# Patient Record
Sex: FEMALE | Race: WHITE | NOT HISPANIC OR LATINO
[De-identification: names, ages, dates, MRNs, and addresses within clinical notes are randomized per-mention and may not be internally consistent; named-entity substitution may affect disease eponyms.]

---

## 2024-09-05 VITALS
HEART RATE: 97 BPM | TEMPERATURE: 98 F | HEIGHT: 68 IN | WEIGHT: 169.98 LBS | SYSTOLIC BLOOD PRESSURE: 127 MMHG | OXYGEN SATURATION: 97 % | RESPIRATION RATE: 16 BRPM | DIASTOLIC BLOOD PRESSURE: 84 MMHG

## 2024-09-05 LAB
ADD ON TEST-SPECIMEN IN LAB: SIGNIFICANT CHANGE UP
ANION GAP SERPL CALC-SCNC: 11 MMOL/L — SIGNIFICANT CHANGE UP (ref 5–17)
BASOPHILS # BLD AUTO: 0.07 K/UL — SIGNIFICANT CHANGE UP (ref 0–0.2)
BASOPHILS NFR BLD AUTO: 0.7 % — SIGNIFICANT CHANGE UP (ref 0–2)
BUN SERPL-MCNC: 18 MG/DL — SIGNIFICANT CHANGE UP (ref 7–23)
CALCIUM SERPL-MCNC: 9.9 MG/DL — SIGNIFICANT CHANGE UP (ref 8.4–10.5)
CHLORIDE SERPL-SCNC: 100 MMOL/L — SIGNIFICANT CHANGE UP (ref 96–108)
CO2 SERPL-SCNC: 27 MMOL/L — SIGNIFICANT CHANGE UP (ref 22–31)
CREAT SERPL-MCNC: 0.74 MG/DL — SIGNIFICANT CHANGE UP (ref 0.5–1.3)
EGFR: 114 ML/MIN/1.73M2 — SIGNIFICANT CHANGE UP
EOSINOPHIL # BLD AUTO: 0.31 K/UL — SIGNIFICANT CHANGE UP (ref 0–0.5)
EOSINOPHIL NFR BLD AUTO: 3 % — SIGNIFICANT CHANGE UP (ref 0–6)
GLUCOSE SERPL-MCNC: 92 MG/DL — SIGNIFICANT CHANGE UP (ref 70–99)
HCG SERPL-ACNC: <1 MIU/ML — SIGNIFICANT CHANGE UP
HCT VFR BLD CALC: 38.7 % — SIGNIFICANT CHANGE UP (ref 34.5–45)
HGB BLD-MCNC: 12.9 G/DL — SIGNIFICANT CHANGE UP (ref 11.5–15.5)
IMM GRANULOCYTES NFR BLD AUTO: 0.4 % — SIGNIFICANT CHANGE UP (ref 0–0.9)
LYMPHOCYTES # BLD AUTO: 2.07 K/UL — SIGNIFICANT CHANGE UP (ref 1–3.3)
LYMPHOCYTES # BLD AUTO: 20 % — SIGNIFICANT CHANGE UP (ref 13–44)
MCHC RBC-ENTMCNC: 29.9 PG — SIGNIFICANT CHANGE UP (ref 27–34)
MCHC RBC-ENTMCNC: 33.3 GM/DL — SIGNIFICANT CHANGE UP (ref 32–36)
MCV RBC AUTO: 89.6 FL — SIGNIFICANT CHANGE UP (ref 80–100)
MONOCYTES # BLD AUTO: 0.98 K/UL — HIGH (ref 0–0.9)
MONOCYTES NFR BLD AUTO: 9.5 % — SIGNIFICANT CHANGE UP (ref 2–14)
NEUTROPHILS # BLD AUTO: 6.88 K/UL — SIGNIFICANT CHANGE UP (ref 1.8–7.4)
NEUTROPHILS NFR BLD AUTO: 66.4 % — SIGNIFICANT CHANGE UP (ref 43–77)
NRBC # BLD: 0 /100 WBCS — SIGNIFICANT CHANGE UP (ref 0–0)
PLATELET # BLD AUTO: 286 K/UL — SIGNIFICANT CHANGE UP (ref 150–400)
POTASSIUM SERPL-MCNC: 3.6 MMOL/L — SIGNIFICANT CHANGE UP (ref 3.5–5.3)
POTASSIUM SERPL-SCNC: 3.6 MMOL/L — SIGNIFICANT CHANGE UP (ref 3.5–5.3)
RBC # BLD: 4.32 M/UL — SIGNIFICANT CHANGE UP (ref 3.8–5.2)
RBC # FLD: 11.9 % — SIGNIFICANT CHANGE UP (ref 10.3–14.5)
SODIUM SERPL-SCNC: 138 MMOL/L — SIGNIFICANT CHANGE UP (ref 135–145)
WBC # BLD: 10.35 K/UL — SIGNIFICANT CHANGE UP (ref 3.8–10.5)
WBC # FLD AUTO: 10.35 K/UL — SIGNIFICANT CHANGE UP (ref 3.8–10.5)

## 2024-09-05 PROCEDURE — 73701 CT LOWER EXTREMITY W/DYE: CPT | Mod: 26,RT,MC

## 2024-09-05 PROCEDURE — 73630 X-RAY EXAM OF FOOT: CPT | Mod: 26,RT

## 2024-09-05 PROCEDURE — 99285 EMERGENCY DEPT VISIT HI MDM: CPT

## 2024-09-05 RX ORDER — CEFAZOLIN SODIUM 2 G/100ML
1000 INJECTION, SOLUTION INTRAVENOUS ONCE
Refills: 0 | Status: COMPLETED | OUTPATIENT
Start: 2024-09-05 | End: 2024-09-05

## 2024-09-05 RX ORDER — DOXYCYCLINE MONOHYDRATE 100 MG
100 TABLET ORAL ONCE
Refills: 0 | Status: COMPLETED | OUTPATIENT
Start: 2024-09-05 | End: 2024-09-05

## 2024-09-05 RX ADMIN — Medication 110 MILLIGRAM(S): at 20:09

## 2024-09-05 RX ADMIN — CEFAZOLIN SODIUM 100 MILLIGRAM(S): 2 INJECTION, SOLUTION INTRAVENOUS at 19:55

## 2024-09-05 NOTE — ED ADULT NURSE NOTE - CHIEF COMPLAINT QUOTE
Pt states "a little over a week ago I was on vacation and stung by a stingray. They gave me a tetanus. I went to an urgent care again and she said it looked infected." Pt denies fever at home.

## 2024-09-05 NOTE — CONSULT NOTE ADULT - ASSESSMENT
29 year old female with no PMH presents for discoloration and swelling to dorsal Right foot due to an injury in salt water. Says she was swimming in the [employer] last week when her foot was cut in the water. She isn't sure what it was, but thinks it was a sting ray. Pt has dorsal puncture wounds to Right 2nd toe. Superficial wounds. No fluctuance. No drainage. No malodor. No PTB. Purplish discoloration to dorsal forefoot. Blanches on palpation. Interspaces are intact. At time of consult, WBC 10.3, ESR 15, CRP 6.3. VSS. There is no evidence of foreign body, no gas, no fracture as seen on Xray.  29 year old female with no PMH presents for discoloration and swelling to dorsal Right foot due to an injury in salt water. Says she was swimming in the [employer] last week when her foot was cut in the water. She isn't sure what it was, but thinks it was a sting ray. Pt has dorsal puncture wounds to Right 2nd toe. Superficial wounds. No fluctuance. No drainage. No malodor. No PTB. Purplish discoloration to dorsal forefoot. Blanches on palpation. Interspaces are intact. At time of consult, WBC 10.3, ESR 15, CRP 6.3. VSS. There is no evidence of foreign body, no gas, no fracture as seen on Xray. Concern for abnormally high swelling of Right foot.     Plan:    -Recommend pt to be admitted to Medicine.   -Recommend broad spectrum IV abx.  -Dressing changes, local wound care, monitoring edema, and monitoring of cardinal signs of infection.  -CT reviewed.  -X-rays reviewed.  -Offloading/WB status: WBAT in surgical shoe   -Right lower extremity should be placed in a elevated position.  -Wounds cleansed with 30 cc of betadine flush.  -Dressed with DSD, ACE.   -Rest of care up to primary team      Podiatry following, Plan discussed with attending.

## 2024-09-05 NOTE — ED PROVIDER NOTE - CLINICAL SUMMARY MEDICAL DECISION MAKING FREE TEXT BOX
Marine envenomation right dorsal foot at base of 2-3rd toes.  Occurred 1 week ago, initially treated with tetanus booster and soaked in iodine; swelling and redness increased in the past day.  Likely early infection developing. Swelling is mild, no concern for compartment syndrome at this time. No crepitus or other evidence to suggest necrotizing infection at this time.  No leg swelling or calf tenderness to suggest DVT. No evidence of limb ischemia. Pt  states it was likely stingray envenomation, and in salt water.  Triple abx coverage (per UTDOL recommendations) started (1st gen cephalosporin, doxycycline, levaquin).  No FB or subcut gas seen on XR.  No fever or other symptoms of acute systemic illness.  WBC upper limit normal.  Podiatry to consult.

## 2024-09-05 NOTE — ED ADULT TRIAGE NOTE - CHIEF COMPLAINT QUOTE
Pt states "a little over a week ago I was on vacation and stung by a stingray. They gave me a tetanus. I went to an urgent care again and she said it looked infected." Pt denies fever at home. return to ED if symptoms worsen, persist or questions arise/need for outpatient follow-up/lab results

## 2024-09-05 NOTE — CONSULT NOTE ADULT - SUBJECTIVE AND OBJECTIVE BOX
Attending: Dr. Paulson    Patient is a 29y old  Female who presents with a chief complaint of Right foot salt water wound.     HPI:    29 year old female with no PMH presents for discoloration and swelling to dorsal Right foot due to an injury in salt water. Says she was swimming in the [employer] last week when her foot was cut in the water. She isn't sure what it was, but thinks it was a sting ray. Pt went to urgent care where they did Betadine flush and gave her a tetanus shot. They did not discharge her on antibiotics. Says she has been treating her foot with neosporin and covering with band aid, and doing Epsom salt soaks. She said the swelling came down a bit but last night it went back up. Pt states the discoloration to her foot has stayed the same. Denies any drainage or pus from the wounds. Denies any pain to the area. Admits to numbness to dorsal foot. Currently on Cefazolin, Doxycycline, Levofloxacin in ED.     Review of systems negative except per HPI and as stated below  General:	 no weakness; no fevers, no chills  Skin/Breast: no rash  Respiratory and Thorax: no SOB, no cough  Cardiovascular:	No chest pain  Gastrointestinal:	 no nausea, vomiting , diarrhea  Genitourinary:	no dysuria, no difficulty urinating, no hematuria  Musculoskeletal:	no weakness, no joint swelling/pain  Neurological:	no focal weakness/numbness  Endocrine:	no polyuria, no polydipsia    PAST MEDICAL & SURGICAL HISTORY:    Home Medications:    Allergies    No Known Allergies    Intolerances      FAMILY HISTORY:    Social History:       LABS                        12.9   10.35 )-----------( 286      ( 05 Sep 2024 20:06 )             38.7     09-05    138  |  100  |  18  ----------------------------<  92  3.6   |  27  |  0.74    Ca    9.9      05 Sep 2024 20:06        ESR: 15  CRP: --  09-05 @ 20:06    Vital Signs Last 24 Hrs  T(C): 36.9 (05 Sep 2024 18:33), Max: 36.9 (05 Sep 2024 18:33)  T(F): 98.4 (05 Sep 2024 18:33), Max: 98.4 (05 Sep 2024 18:33)  HR: 97 (05 Sep 2024 18:33) (97 - 97)  BP: 127/84 (05 Sep 2024 18:33) (127/84 - 127/84)  BP(mean): --  RR: 16 (05 Sep 2024 18:33) (16 - 16)  SpO2: 97% (05 Sep 2024 18:33) (97% - 97%)    Parameters below as of 05 Sep 2024 18:33  Patient On (Oxygen Delivery Method): room air        PHYSICAL EXAM  General: NAD, AA0x3    Lower Extremity Focused:  Vasc:  Derm:  Neuro:  MSK:     Gait Examination:    RADIOLOGY                       Attending: Dr. Paulson    Patient is a 29y old  Female who presents with a chief complaint of Right foot salt water wound.     HPI:    29 year old female with no PMH presents for discoloration and swelling to dorsal Right foot due to an injury in salt water. Says she was swimming in the [place] last week when her foot was cut in the water. She isn't sure what it was, but thinks it was a sting ray. Pt went to urgent care where they did Betadine flush and gave her a tetanus shot. They did not discharge her on antibiotics. Says she has been treating her foot with neosporin and covering with band aid, and doing Epsom salt soaks. She said the swelling came down a bit but last night it went back up. Pt states the discoloration to her foot has stayed the same. Denies any drainage or pus from the wounds. Denies any pain to the area. Admits to numbness to dorsal foot. Currently on Cefazolin, Doxycycline, Levofloxacin in ED.     Review of systems negative except per HPI and as stated below  General:	 no weakness; no fevers, no chills  Skin/Breast: no rash  Respiratory and Thorax: no SOB, no cough  Cardiovascular:	No chest pain  Gastrointestinal:	 no nausea, vomiting , diarrhea  Genitourinary:	no dysuria, no difficulty urinating, no hematuria  Musculoskeletal:	no weakness, no joint swelling/pain  Neurological:	no focal weakness/numbness  Endocrine:	no polyuria, no polydipsia    PAST MEDICAL & SURGICAL HISTORY:    Home Medications:    Allergies    No Known Allergies    Intolerances      FAMILY HISTORY:    Social History:       LABS                        12.9   10.35 )-----------( 286      ( 05 Sep 2024 20:06 )             38.7     09-05    138  |  100  |  18  ----------------------------<  92  3.6   |  27  |  0.74    Ca    9.9      05 Sep 2024 20:06        ESR: 15  CRP: --  09-05 @ 20:06    Vital Signs Last 24 Hrs  T(C): 36.9 (05 Sep 2024 18:33), Max: 36.9 (05 Sep 2024 18:33)  T(F): 98.4 (05 Sep 2024 18:33), Max: 98.4 (05 Sep 2024 18:33)  HR: 97 (05 Sep 2024 18:33) (97 - 97)  BP: 127/84 (05 Sep 2024 18:33) (127/84 - 127/84)  BP(mean): --  RR: 16 (05 Sep 2024 18:33) (16 - 16)  SpO2: 97% (05 Sep 2024 18:33) (97% - 97%)    Parameters below as of 05 Sep 2024 18:33  Patient On (Oxygen Delivery Method): room air        PHYSICAL EXAM  General: NAD, AA0x3    Lower Extremity Focused:  Vasc: DP, PT 2/4. TG warm to cool. Edema to dorsal foot.   Derm: Dorsal puncture wounds to Right 2nd toe. Superficial wounds. No fluctuance. No drainage. No malodor. No PTB. Purplish discoloration to dorsal forefoot. Blanches on palpation. Interspaces are intact.   Neuro: Protective sensation intact.   MSK: -TTP to dorsal foot. No pain on ROM of 2nd toe.     Gait Examination:    RADIOLOGY  < from: Xray Foot AP + Lateral + Oblique, Right (09.05.24 @ 19:47) >  IMPRESSION: Negative for an acute displaced fracture or dislocation. The   joint spaces are preserved. There is no evidence of a radiopaque foreign   body.    --- End of Report ---    < end of copied text >                       Attending: Dr. Paulson    Patient is a 29y old  Female who presents with a chief complaint of Right foot salt water wound.     HPI:    29 year old female with no PMH presents for discoloration and swelling to dorsal Right foot due to an injury in salt water. Says she was swimming in the [employer] last week when her foot was cut in the water. She isn't sure what it was, but thinks it was a sting ray. Pt went to urgent care where they did Betadine flush and gave her a tetanus shot. They did not discharge her on antibiotics. Says she has been treating her foot with neosporin and covering with band aid, and doing Epsom salt soaks. She said the swelling came down a bit but last night it went back up. Pt states the discoloration to her foot has stayed the same. Denies any drainage or pus from the wounds. Denies any pain to the area. Admits to numbness to dorsal foot. Currently on Cefazolin, Doxycycline, Levofloxacin in ED.     Review of systems negative except per HPI and as stated below  General:	 no weakness; no fevers, no chills  Skin/Breast: no rash  Respiratory and Thorax: no SOB, no cough  Cardiovascular:	No chest pain  Gastrointestinal:	 no nausea, vomiting , diarrhea  Genitourinary:	no dysuria, no difficulty urinating, no hematuria  Musculoskeletal:	no weakness, no joint swelling/pain  Neurological:	no focal weakness/numbness  Endocrine:	no polyuria, no polydipsia    PAST MEDICAL & SURGICAL HISTORY:    Home Medications:    Allergies    No Known Allergies    Intolerances      FAMILY HISTORY:    Social History:       LABS                        12.9   10.35 )-----------( 286      ( 05 Sep 2024 20:06 )             38.7     09-05    138  |  100  |  18  ----------------------------<  92  3.6   |  27  |  0.74    Ca    9.9      05 Sep 2024 20:06        ESR: 15  CRP: --  09-05 @ 20:06    Vital Signs Last 24 Hrs  T(C): 36.9 (05 Sep 2024 18:33), Max: 36.9 (05 Sep 2024 18:33)  T(F): 98.4 (05 Sep 2024 18:33), Max: 98.4 (05 Sep 2024 18:33)  HR: 97 (05 Sep 2024 18:33) (97 - 97)  BP: 127/84 (05 Sep 2024 18:33) (127/84 - 127/84)  BP(mean): --  RR: 16 (05 Sep 2024 18:33) (16 - 16)  SpO2: 97% (05 Sep 2024 18:33) (97% - 97%)    Parameters below as of 05 Sep 2024 18:33  Patient On (Oxygen Delivery Method): room air        PHYSICAL EXAM  General: NAD, AA0x3    Lower Extremity Focused:  Vasc: DP, PT 2/4. TG warm to cool. Edema to dorsal foot.   Derm: Dorsal puncture wounds to Right 2nd toe. Superficial wounds. No fluctuance. No drainage. No malodor. No PTB. Purplish discoloration to dorsal forefoot. Blanches on palpation. Interspaces are intact.   Neuro: Protective sensation intact.   MSK: -TTP to dorsal foot. No pain on ROM of 2nd toe.     Gait Examination:    RADIOLOGY  < from: CT Foot w/ IV Cont, Right (09.05.24 @ 22:22) >  IMPRESSION:  Foot edema particularly on the dorsal medial aspect involving the   subcutaneous  soft tissues. No evidence for abscess formation.    < end of copied text >      < from: Xray Foot AP + Lateral + Oblique, Right (09.05.24 @ 19:47) >  IMPRESSION: Negative for an acute displaced fracture or dislocation. The   joint spaces are preserved. There is no evidence of a radiopaque foreign   body.    --- End of Report ---    < end of copied text >

## 2024-09-05 NOTE — ED PROVIDER NOTE - PHYSICAL EXAMINATION
CONSTITUTIONAL: NAD   SKIN: Normal color and turgor.    HEAD: NC/AT.  EYES: Conjunctiva clear. Anicteric sclera.  ENT: Airway clear. Normal voice. MMM.  RESPIRATORY:  Normal respiratory rate and effort.  CARDIOVASCULAR:  RRR   GI:  Abdomen soft, nontender.    MSK:  Dorsal puncture wounds without drainage to  base of right 2nd toe with irregular area of blanching purple discoloration. No fluctuance. Mild forefoot swelling, not tense.  Palpable DP and PT pulses, cap refill < 2 sec.   NEURO: Alert, clear mental status.  Speech clear. No focal deficits. Gait steady.

## 2024-09-05 NOTE — ED PROVIDER NOTE - OBJECTIVE STATEMENT
Patient presents today with progressive right foot swelling for 1 day after a stingray puncture wound 1 week ago. Patient was seen by urgent care at the time of injury to confirm no foreign bodies on imaging and received tetanus vaccination. She reports today the right foot  has unchanged discoloration surrounding the wound and notes worsening swelling with associated numbness since this morning. Patient denies pain with movement, decreased rang of motion, calf tenderness, tingling, smoking history, vascular disease, diabetes or current medications. Patient denies risk of pregnancy and allergies.

## 2024-09-05 NOTE — ED ADULT TRIAGE NOTE - AS TEMP SITE
Medication: albuterol inh  Last office visit date: 5/3/23  Next visit: none  Pharmacy: [id number] [id number]    Medication Refill Protocol Failed.  Failed criteria:    Patient is pregnant.   Sent to clinician to review.     
oral

## 2024-09-05 NOTE — ED ADULT NURSE NOTE - OBJECTIVE STATEMENT
Pt presents to ED S/P " stingray sting" to right foot with discoloration. Pt states, " It feels like I had novocaine in the part that is discolored". Pt able to ambulate independently. Pt presents to ED S/P " stingray sting" to right foot with discoloration and swelling. Pt states, " It feels like I had novocaine in the part that is discolored". Pt able to ambulate independently.

## 2024-09-06 ENCOUNTER — TRANSCRIPTION ENCOUNTER (OUTPATIENT)
Age: 29
End: 2024-09-06

## 2024-09-06 ENCOUNTER — INPATIENT (INPATIENT)
Facility: HOSPITAL | Age: 29
LOS: 2 days | Discharge: HOME | DRG: 603 | End: 2024-09-09
Attending: HOSPITALIST | Admitting: STUDENT IN AN ORGANIZED HEALTH CARE EDUCATION/TRAINING PROGRAM
Payer: COMMERCIAL

## 2024-09-06 DIAGNOSIS — L03.115 CELLULITIS OF RIGHT LOWER LIMB: ICD-10-CM

## 2024-09-06 DIAGNOSIS — R63.8 OTHER SYMPTOMS AND SIGNS CONCERNING FOOD AND FLUID INTAKE: ICD-10-CM

## 2024-09-06 LAB
ALBUMIN SERPL ELPH-MCNC: 4.2 G/DL — SIGNIFICANT CHANGE UP (ref 3.3–5)
ALP SERPL-CCNC: 72 U/L — SIGNIFICANT CHANGE UP (ref 40–120)
ALT FLD-CCNC: 16 U/L — SIGNIFICANT CHANGE UP (ref 10–45)
ANION GAP SERPL CALC-SCNC: 11 MMOL/L — SIGNIFICANT CHANGE UP (ref 5–17)
AST SERPL-CCNC: 15 U/L — SIGNIFICANT CHANGE UP (ref 10–40)
BASOPHILS # BLD AUTO: 0.05 K/UL — SIGNIFICANT CHANGE UP (ref 0–0.2)
BASOPHILS NFR BLD AUTO: 0.6 % — SIGNIFICANT CHANGE UP (ref 0–2)
BILIRUB SERPL-MCNC: 0.4 MG/DL — SIGNIFICANT CHANGE UP (ref 0.2–1.2)
BLD GP AB SCN SERPL QL: NEGATIVE — SIGNIFICANT CHANGE UP
BUN SERPL-MCNC: 13 MG/DL — SIGNIFICANT CHANGE UP (ref 7–23)
CALCIUM SERPL-MCNC: 9.5 MG/DL — SIGNIFICANT CHANGE UP (ref 8.4–10.5)
CHLORIDE SERPL-SCNC: 105 MMOL/L — SIGNIFICANT CHANGE UP (ref 96–108)
CO2 SERPL-SCNC: 22 MMOL/L — SIGNIFICANT CHANGE UP (ref 22–31)
CREAT SERPL-MCNC: 0.7 MG/DL — SIGNIFICANT CHANGE UP (ref 0.5–1.3)
EGFR: 121 ML/MIN/1.73M2 — SIGNIFICANT CHANGE UP
EOSINOPHIL # BLD AUTO: 0.37 K/UL — SIGNIFICANT CHANGE UP (ref 0–0.5)
EOSINOPHIL NFR BLD AUTO: 4.7 % — SIGNIFICANT CHANGE UP (ref 0–6)
GLUCOSE SERPL-MCNC: 94 MG/DL — SIGNIFICANT CHANGE UP (ref 70–99)
HCT VFR BLD CALC: 36.7 % — SIGNIFICANT CHANGE UP (ref 34.5–45)
HGB BLD-MCNC: 12.1 G/DL — SIGNIFICANT CHANGE UP (ref 11.5–15.5)
IMM GRANULOCYTES NFR BLD AUTO: 0.4 % — SIGNIFICANT CHANGE UP (ref 0–0.9)
LYMPHOCYTES # BLD AUTO: 1.87 K/UL — SIGNIFICANT CHANGE UP (ref 1–3.3)
LYMPHOCYTES # BLD AUTO: 23.7 % — SIGNIFICANT CHANGE UP (ref 13–44)
MAGNESIUM SERPL-MCNC: 2 MG/DL — SIGNIFICANT CHANGE UP (ref 1.6–2.6)
MCHC RBC-ENTMCNC: 29.9 PG — SIGNIFICANT CHANGE UP (ref 27–34)
MCHC RBC-ENTMCNC: 33 GM/DL — SIGNIFICANT CHANGE UP (ref 32–36)
MCV RBC AUTO: 90.6 FL — SIGNIFICANT CHANGE UP (ref 80–100)
MONOCYTES # BLD AUTO: 1.16 K/UL — HIGH (ref 0–0.9)
MONOCYTES NFR BLD AUTO: 14.7 % — HIGH (ref 2–14)
NEUTROPHILS # BLD AUTO: 4.4 K/UL — SIGNIFICANT CHANGE UP (ref 1.8–7.4)
NEUTROPHILS NFR BLD AUTO: 55.9 % — SIGNIFICANT CHANGE UP (ref 43–77)
NRBC # BLD: 0 /100 WBCS — SIGNIFICANT CHANGE UP (ref 0–0)
PHOSPHATE SERPL-MCNC: 4.4 MG/DL — SIGNIFICANT CHANGE UP (ref 2.5–4.5)
PLATELET # BLD AUTO: 252 K/UL — SIGNIFICANT CHANGE UP (ref 150–400)
POTASSIUM SERPL-MCNC: 3.9 MMOL/L — SIGNIFICANT CHANGE UP (ref 3.5–5.3)
POTASSIUM SERPL-SCNC: 3.9 MMOL/L — SIGNIFICANT CHANGE UP (ref 3.5–5.3)
PROT SERPL-MCNC: 7.1 G/DL — SIGNIFICANT CHANGE UP (ref 6–8.3)
RBC # BLD: 4.05 M/UL — SIGNIFICANT CHANGE UP (ref 3.8–5.2)
RBC # FLD: 11.9 % — SIGNIFICANT CHANGE UP (ref 10.3–14.5)
RH IG SCN BLD-IMP: POSITIVE — SIGNIFICANT CHANGE UP
SODIUM SERPL-SCNC: 138 MMOL/L — SIGNIFICANT CHANGE UP (ref 135–145)
WBC # BLD: 7.88 K/UL — SIGNIFICANT CHANGE UP (ref 3.8–10.5)
WBC # FLD AUTO: 7.88 K/UL — SIGNIFICANT CHANGE UP (ref 3.8–10.5)

## 2024-09-06 PROCEDURE — 99255 IP/OBS CONSLTJ NEW/EST HI 80: CPT

## 2024-09-06 PROCEDURE — 99223 1ST HOSP IP/OBS HIGH 75: CPT | Mod: GC

## 2024-09-06 RX ORDER — PIPERACILLIN SODIUM AND TAZOBACTAM SODIUM 3; .375 G/15ML; G/15ML
3.38 INJECTION, POWDER, FOR SOLUTION INTRAVENOUS ONCE
Refills: 0 | Status: DISCONTINUED | OUTPATIENT
Start: 2024-09-06 | End: 2024-09-06

## 2024-09-06 RX ORDER — CEFAZOLIN SODIUM 2 G/100ML
INJECTION, SOLUTION INTRAVENOUS
Refills: 0 | Status: DISCONTINUED | OUTPATIENT
Start: 2024-09-06 | End: 2024-09-06

## 2024-09-06 RX ORDER — CEFAZOLIN SODIUM 2 G/100ML
2000 INJECTION, SOLUTION INTRAVENOUS EVERY 8 HOURS
Refills: 0 | Status: DISCONTINUED | OUTPATIENT
Start: 2024-09-06 | End: 2024-09-06

## 2024-09-06 RX ORDER — DOXYCYCLINE MONOHYDRATE 100 MG
100 TABLET ORAL EVERY 12 HOURS
Refills: 0 | Status: DISCONTINUED | OUTPATIENT
Start: 2024-09-06 | End: 2024-09-07

## 2024-09-06 RX ORDER — CEFAZOLIN SODIUM 2 G/100ML
2000 INJECTION, SOLUTION INTRAVENOUS ONCE
Refills: 0 | Status: COMPLETED | OUTPATIENT
Start: 2024-09-06 | End: 2024-09-06

## 2024-09-06 RX ADMIN — Medication 110 MILLIGRAM(S): at 17:00

## 2024-09-06 RX ADMIN — CEFAZOLIN SODIUM 100 MILLIGRAM(S): 2 INJECTION, SOLUTION INTRAVENOUS at 14:08

## 2024-09-06 RX ADMIN — Medication 100 MILLIGRAM(S): at 21:36

## 2024-09-06 RX ADMIN — CEFAZOLIN SODIUM 100 MILLIGRAM(S): 2 INJECTION, SOLUTION INTRAVENOUS at 07:50

## 2024-09-06 NOTE — DISCHARGE NOTE PROVIDER - NSDCFUSCHEDAPPT_GEN_ALL_CORE_FT
Riley Cramer  [facility]  INFDISEASE [address]  Scheduled Appointment: 09/25/2024

## 2024-09-06 NOTE — DISCHARGE NOTE PROVIDER - PROVIDER TOKENS
PROVIDER:[TOKEN:[[id number]],SCHEDULEDAPPT:[09/10/2024]] FREE:[LAST:[Walkner],FIRST:[Kasandra],PHONE:[phone]],FAX:[(   )    -],ADDRESS:[[address] [place] [address]],SCHEDULEDAPPT:[09/10/2024],SCHEDULEDAPPTTIME:[11:00 AM]] FREE:[LAST:[Walkner],FIRST:[Kasandra],PHONE:[phone]],FAX:[(   )    -],ADDRESS:[[address] [place] [address]],SCHEDULEDAPPT:[09/13/2024],SCHEDULEDAPPTTIME:[01:30 PM]] FREE:[LAST:[Walkner],FIRST:[Kasandra],PHONE:[phone]],FAX:[(   )    -],ADDRESS:[[address] [place] [address]. [place] [address]],SCHEDULEDAPPT:[09/13/2024],SCHEDULEDAPPTTIME:[01:30 PM]],PROVIDER:[TOKEN:[[id number]],FOLLOWUP:[2 weeks]] PROVIDER:[TOKEN:[[id number]],SCHEDULEDAPPT:[09/25/2024],SCHEDULEDAPPTTIME:[10:20 AM]],FREE:[LAST:[Walkner],FIRST:[Kasandra],PHONE:[phone]],FAX:[(   )    -],ADDRESS:[[address] [place] [address]],SCHEDULEDAPPT:[09/13/2024],SCHEDULEDAPPTTIME:[01:30 PM]]

## 2024-09-06 NOTE — H&P ADULT - NSHPLABSRESULTS_GEN_ALL_CORE
LABS:                        12.9   10.35 )-----------( 286      ( 05 Sep 2024 20:06 )             38.7     09-05    138  |  100  |  18  ----------------------------<  92  3.6   |  27  |  0.74    Ca    9.9      05 Sep 2024 20:06        Urinalysis Basic - ( 05 Sep 2024 20:06 )    Color: x / Appearance: x / SG: x / pH: x  Gluc: 92 mg/dL / Ketone: x  / Bili: x / Urobili: x   Blood: x / Protein: x / Nitrite: x   Leuk Esterase: x / RBC: x / WBC x   Sq Epi: x / Non Sq Epi: x / Bacteria: x    MEDICATIONS  (STANDING):    MEDICATIONS  (PRN):

## 2024-09-06 NOTE — PROGRESS NOTE ADULT - SUBJECTIVE AND OBJECTIVE BOX
Patient is a 29y old  Female who presents with a chief complaint of R 2nd toe cellulitis (06 Sep 2024 02:18)      INTERVAL HPI/ OVERNIGHT EVENTS. Dressing C/D/I. No acute events.       LABS                        12.1   7.88  )-----------( 252      ( 06 Sep 2024 06:39 )             36.7     09-06    138  |  105  |  13  ----------------------------<  94  3.9   |  22  |  0.70    Ca    9.5      06 Sep 2024 06:39  Phos  4.4     09-06  Mg     2.0     09-06    TPro  7.1  /  Alb  4.2  /  TBili  0.4  /  DBili  x   /  AST  15  /  ALT  16  /  AlkPhos  72  09-06      ESR: 15  CRP: --  09-05 @ 20:06    ICU Vital Signs Last 24 Hrs  T(C): 36.9 (06 Sep 2024 05:54), Max: 37.3 (05 Sep 2024 23:47)  T(F): 98.4 (06 Sep 2024 05:54), Max: 99.2 (05 Sep 2024 23:47)  HR: 83 (06 Sep 2024 05:54) (83 - 98)  BP: 102/63 (06 Sep 2024 05:54) (101/68 - 127/84)  BP(mean): 79 (06 Sep 2024 01:16) (79 - 79)  ABP: --  ABP(mean): --  RR: 17 (06 Sep 2024 05:54) (16 - 17)  SpO2: 98% (06 Sep 2024 05:54) (97% - 99%)    O2 Parameters below as of 06 Sep 2024 05:54  Patient On (Oxygen Delivery Method): room air            RADIOLOGY  < from: CT Foot w/ IV Cont, Right (09.05.24 @ 22:22) >  IMPRESSION:  Dorsal foot subcutaneous edema, which may be seen in cellulitis. No   evidence of soft tissue abscess, osteomyelitis, or soft tissue gas.    --- End of Report ---    < end of copied text >    < from: Xray Foot AP + Lateral + Oblique, Right (09.05.24 @ 19:47) >  IMPRESSION: Negative for an acute displaced fracture or dislocation. The   joint spaces are preserved. There is no evidence of a radiopaque foreign   body.    --- End of Report ---    < end of copied text >        MICROBIOLOGY    PHYSICAL EXAM  Lower Extremity Focused  Vasc: DP, PT 2/4. TG warm to cool. Improvement of edema to dorsal foot.   Derm: Dorsal puncture wounds to Right 2nd toe. Superficial wounds. No fluctuance. No drainage. No malodor. No PTB. Purplish discoloration to dorsal forefoot. Blanches on palpation. Interspaces are intact.   Neuro: Protective sensation intact.   MSK: -TTP to dorsal foot. No pain on ROM of 2nd toe.

## 2024-09-06 NOTE — H&P ADULT - ATTENDING COMMENTS
Patient was seen and examined at bedside on 9/6/2024 at 11 am. Patient reports that foot is minimally changed. Denies abdominal pain, SOB, N/V. ROS is otherwise negative. Vitals, labwork and pertinent imaging reviewed. Exam - NAD, AAO x 4, PERRLA, EOMI, MMM, supple neck, chest - CTA b/l, CV - rrr, s1s2, no m/r/g, abd - soft, NTND, + BS, ext - wwp, RLE - foot w/ erythema, punctures at digits 2 - 4, psych - normal affect, skin - no rash    Plan:  -ID consult  -C/w CTX and Doxycycline  -MRI

## 2024-09-06 NOTE — PATIENT PROFILE ADULT - FALL HARM RISK - HARM RISK INTERVENTIONS
Assistance with ambulation/Communicate Risk of Fall with Harm to all staff/Monitor for mental status changes/Monitor gait and stability/Reinforce activity limits and safety measures with patient and family/Tailored Fall Risk Interventions/Visual Cue: Yellow wristband and red socks/Bed in lowest position, wheels locked, appropriate side rails in place/Call bell, personal items and telephone in reach/Instruct patient to call for assistance before getting out of bed or chair/Non-slip footwear when patient is out of bed/[place] to call system/Physically safe environment - no spills, clutter or unnecessary equipment/Purposeful Proactive Rounding/Room/bathroom lighting operational, light cord in reach

## 2024-09-06 NOTE — CONSULT NOTE ADULT - ATTENDING COMMENTS
29F, previously healthy, who ~1 week [place] [facility] when she was stung ?by a stingray on dorsum of R 2nd toe. She went to urgent care and had wound irrigated and got a tetanus shot. She was then soaking her foot with espom salt soaks, and treating with topical neosporin. Swelling of R foot initially improved, but then 9/5 swelling worsened for which she presented to ED. No fevers/chills/sweats or systemic symptoms associated with this worsening swelling, although the has noticed more redness/tenderness of her foot along with the swelling. Since admission she has been afebrile and hemodynamically stable. CRP 6 but otherwise labs normal. CT R foot w/ contrast with dorsal foot subQ edema without abscess/gas or signs of OM. Patient was given cefazolin/doxy/levaquin in ED, and then switched to cefazolin by medicine team. Podiatry following, with no surgical plans. No cultures obtained from foot or blood. She has not improved on cefazolin and so ID consulted to assist with management.    Given saltwater exposure, will plan to expand coverage to Vibrio and broader gram neg coverage (including aeromonas, Plesiomonas). Lower suspicion for PsA given salt water rather than fresh water.    Recommendations:  - Stop ancef  - Start ceftriaxone 2g IV q24h (for broader gram neg coverage)  - Start doxycyline 100mg IV q12h (for Vibrio and also for broad gram positive coverage)  - MRI R foot w/wo contrast to r/o OM acute OM/septic arthritis of R 2nd MTPJ joint. Exam is difficult at this time due to swelling. If swelling significantly improves and exam becomes reassuring, could potentially defer this MRI, but for now would plan to obtain.  - If swelling/erythema spreads significantly beyond marked borders or if fever develops would switch ceftriaxone to cefepime as above, and consider adding linezolid.

## 2024-09-06 NOTE — PATIENT PROFILE ADULT - STATED REASON FOR ADMISSION
"Infection injury to right foot." Per Patient, [place] and was possible stung by a Stingray. She has 3 injury site to her right foot and one underneath her right big toe.

## 2024-09-06 NOTE — H&P ADULT - PROBLEM SELECTOR PLAN 1
Patient presenting w/ swollen R 2nd toe w/ a non purulent open wound after a laceration 7 days ago while swimming. CT and Xray of R foot showing swelling but no presence of abscess or gas accumulation. Podiatry evaluated patient and administered local care with plans for further evaluation. Likely non-purulent cellulitis. S/p cefazolin 1g x1, doxycycline 100mg x1, levofloxacin 750mg PO x1    Plan:   - C/w cefazolin 2g q8  - Start zosyn 3.375g q8  - F/u podiatry recs

## 2024-09-06 NOTE — DISCHARGE NOTE PROVIDER - ATTENDING DISCHARGE PHYSICAL EXAMINATION:
Patient was seen and examined at bedside on 9/9/2024 at 11 am. Patient reports that the distal part of her foot is minimally changed but overall patient reports improvement of all symptoms. Denies abdominal pain, SOB, N/V. ROS is otherwise negative. Vitals, labwork and pertinent imaging reviewed. Exam - NAD, AAO x 4, PERRLA, EOMI, MMM, supple neck, chest - CTA b/l, CV - rrr, s1s2, no m/r/g, abd - soft, NTND, + BS, ext - wwp, RLE - distal foot w/ erythema, punctures at digits 2 - 4, psych - normal affect, skin - no rash    Plan:  -ID consulted - query if need for broadening of coverage  -C/w CTX and Doxycycline - plan for d/c on Cefpodoxime and Doxycycline as per ID  -MRI w/o evidence of OM  -Patient is medically ready for d/c
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
## 2024-09-06 NOTE — DISCHARGE NOTE PROVIDER - NSDCCPCAREPLAN_GEN_ALL_CORE_FT
PRINCIPAL DISCHARGE DIAGNOSIS  Diagnosis: Cellulitis of right lower extremity  Assessment and Plan of Treatment: You came in with right foot swelling and were found to have cellulitis of the right foot. Cellulitis is a common, potentially serious bacterial skin infection. The affected skin is swollen and inflamed and is typically painful and warm to the touch. Cellulitis is caused when bacteria enter through a crack or break in the skin. You were treated with antibiotics while in the hospital. Patient was medically optimized, stable and ready for discharge. Plan of care and return precautions were discussed with the patient who verbally stated understanding.  ----------------------------------------  Please follow up with podiatry outpatient as scheduled       PRINCIPAL DISCHARGE DIAGNOSIS  Diagnosis: Cellulitis of right lower extremity  Assessment and Plan of Treatment: You came in with right foot swelling and were found to have cellulitis of the right foot. Cellulitis is a common, potentially serious bacterial skin infection. The affected skin is swollen and inflamed and is typically painful and warm to the touch. Cellulitis is caused when bacteria enter through a crack or break in the skin. You were treated with antibiotics while in the hospital. Patient was medically optimized, stable and ready for discharge. Plan of care and return precautions were discussed with the patient who verbally stated understanding.  ----------------------------------------  Start taking doxycyline 100mg two times daily for 10 days  Start taking cefpodoxime 400mg two times daily for 10 days  Please follow up with podiatry outpatient as scheduled  Please follow up with infectious disease outpatient as scheduled       PRINCIPAL DISCHARGE DIAGNOSIS  Diagnosis: Cellulitis of right lower extremity  Assessment and Plan of Treatment: You came in with right foot swelling and were found to have cellulitis of the right foot. Cellulitis is a common, potentially serious bacterial skin infection. The affected skin is swollen and inflamed and is typically painful and warm to the touch. Cellulitis is caused when bacteria enter through a crack or break in the skin. You were treated with antibiotics while in the hospital. Patient was medically optimized, stable and ready for discharge. Plan of care and return precautions were discussed with the patient who verbally stated understanding.  ----------------------------------------  Start taking doxycyline 100mg two times daily for 10 days  Start taking cefpodoxime 400mg two times daily for 10 days  Please place your right lower extremity in an elevated position as much as possible  Please follow up with podiatry outpatient as scheduled  Please follow up with infectious disease outpatient as scheduled       PRINCIPAL DISCHARGE DIAGNOSIS  Diagnosis: Cellulitis of right lower extremity  Assessment and Plan of Treatment: You came in with right foot swelling and were found to have cellulitis of the right foot. Cellulitis is a common, potentially serious bacterial skin infection. The affected skin is swollen and inflamed and is typically painful and warm to the touch. Cellulitis is caused when bacteria enter through a crack or break in the skin. You were treated with antibiotics while in the hospital. Patient was medically optimized, stable and ready for discharge. Plan of care and return precautions were discussed with the patient who verbally stated understanding.  ----------------------------------------  Start taking doxycyline 100mg two times daily for 12 days  Start taking cefpodoxime 400mg two times daily for 12 days  Please place your right lower extremity in an elevated position as much as possible  Please follow up with podiatry outpatient as scheduled  Please follow up with infectious disease outpatient as scheduled

## 2024-09-06 NOTE — DISCHARGE NOTE PROVIDER - CARE PROVIDER_API CALL
Kasandra Paulson  Podiatric Medicine and Surgery  Phone: ([phone]  Fax: ()-  Scheduled Appointment: 09/10/2024   Kasandra Paulson  [address], [place] [address]  Phone: [phone]  Fax: (   )    -  Scheduled Appointment: 09/10/2024 11:00 AM   Kasandra aPulson  [address], [place] [address]  Phone: [phone]  Fax: (   )    -  Scheduled Appointment: 09/13/2024 01:30 PM   Kasandra Paulson  [address], [place], [place] [address]  Phone: [phone]  Fax: (   )    -  Scheduled Appointment: 09/13/2024 01:30 PM    Riley Cramer  Infectious Disease  [address]  [place] [address]  Phone: [phone]  Fax: [phone]  Follow Up Time: 2 weeks   Riley Cramer  Infectious Disease  [address]  [place] [address]  Phone: [phone]  Fax: [phone]  Scheduled Appointment: 09/25/2024 10:20 AM    Walkner, Kasandra  [address], [place], [place] [address]  Phone: [phone]  Fax: (   )    -  Scheduled Appointment: 09/13/2024 01:30 PM

## 2024-09-06 NOTE — H&P ADULT - ASSESSMENT
29 year old female with no significant PMHx who presents for pain, discoloration and swelling to dorsal R 2nd toe likely 2/2 non-purulent cellulitis. Will admit to [facility] for IV antibiotics and further management by podiatry.

## 2024-09-06 NOTE — H&P ADULT - PROBLEM SELECTOR PLAN 2
N: regular diet  E: replete as necessary  DVT: improve score 0; does not need  PPI: none  Code: full code  Dispo: 7U

## 2024-09-06 NOTE — H&P ADULT - NSHPPHYSICALEXAM_GEN_ALL_CORE
Constitutional: NAD, comfortable in bed.  HEENT: NC/AT, PERRLA, EOMI, no conjunctival pallor or scleral icterus, MMM  Neck: Supple, no JVD  Respiratory: CTA B/L. No w/r/r.   Cardiovascular: RRR, normal S1 and S2, no m/r/g.   Gastrointestinal: +BS, soft NTND, no guarding or rebound tenderness, no palpable masses   Extremities: Dorsal puncture wounds to Right 2nd toe. Superficial wounds. No fluctuance. No drainage. No malodor. No PTB. Purplish discoloration to dorsal forefoot. Blanches on palpation. Interspaces are intact.   Vascular: Pulses equal and strong throughout.   Neurological: AAOx3, no CN deficits, strength and sensation intact throughout.   Skin: No gross skin abnormalities or rashes

## 2024-09-06 NOTE — PROGRESS NOTE ADULT - ASSESSMENT
29 year old female with no PMH presents for discoloration and swelling to dorsal Right foot due to an injury in salt water. Says she was swimming in the [place] last week when her foot was cut in the water. She isn't sure what it was, but thinks it was a sting ray. Pt has dorsal puncture wounds to Right 2nd toe. Superficial wounds. No fluctuance. No drainage. No malodor. No PTB. Purplish discoloration to dorsal forefoot. Blanches on palpation. Interspaces are intact. At time of consult, WBC 10.3, ESR 15, CRP 6.3. VSS. There is no evidence of foreign body, no gas, no fracture as seen on Xray. Concern for abnormally high swelling of Right foot. Improving of edema to dorsal foot.     Plan:    -Continue IV abx per primary.   -Dressing changes, local wound care, monitoring edema, and monitoring of cardinal signs of infection.  -Offloading/WB status: WBAT in surgical shoe   -Right lower extremity should be placed in a elevated position.  -Wounds cleansed with 30 cc of betadine flush.  -Dressed with betadine paint, DSD, ACE.   -Rest of care up to primary team      Podiatry following, Plan discussed with attending.    Patient should follow up with Dr. Kasandra Paulson within 1 week of discharge.    Office information:          [place] Address- [address], [place] [address] Phone: [phone]         [place] Address- [address], [place] [address] Phone: [phone]   29 year old female with no PMH presents for discoloration and swelling to dorsal Right foot due to an injury in salt water. Says she was swimming in the [place] last week when her foot was cut in the water. She isn't sure what it was, but thinks it was a sting ray. Pt has dorsal puncture wounds to Right 2nd toe. Superficial wounds. No fluctuance. No drainage. No malodor. No PTB. Purplish discoloration to dorsal forefoot. Blanches on palpation. Interspaces are intact. At time of consult, WBC 10.3, ESR 15, CRP 6.3. VSS. There is no evidence of foreign body, no gas, no fracture as seen on Xray. Concern for abnormally high swelling of Right foot. Improving of edema to dorsal foot.     Plan:    -Recommend Keflex, Levofloxacin and Doxycycline PO x 14 days upon discharge.  -Continue IV abx per primary.   -Dressing changes, local wound care, monitoring edema, and monitoring of cardinal signs of infection.  -Offloading/WB status: WBAT in surgical shoe   -Right lower extremity should be placed in a elevated position.  -Wounds cleansed with 30 cc of betadine flush.  -Dressed with betadine paint, DSD, ACE.   -Rest of care up to primary team      Podiatry following, Plan discussed with attending.    Discharge dressing instructions: Cleanse wound with normal sailine daily, pat dry with sterile guaze, dab betadine paint over wounds with sterile gauze, cover with dry sterile gauze, ABD pad, wrap with Kerlix and light ACE bandage.     Patient should follow up with Dr. Kasandra Paulson on 9/10.     Office information:          [place] Address- [address] [address] Phone: [phone]         [place] Address- [address], [place] [address] Phone: [phone]

## 2024-09-06 NOTE — CONSULT NOTE ADULT - ASSESSMENT
Pt is a 29 year old female with no PMH presents for discoloration and swelling to dorsal Right foot due to an injury in salt water after swimming in the [employer] last week when her foot was cut/bit/stung in the water. Pt remains afebrile, VSS WBC 7.88, ESR 15, CRP 6.3. Given salt water exposure would recommend covering for vibrio species as well, therefore would add Doxycycline and c/w CTX. Discoloration/numbness can also be due to toxins from bite/sting. Given the superficial nature of the metatarsal bones would recommend MRI of foot to evaluate for better evaluation of subcutaneous involvement. However, pending clinical improvement over the weekend can potentially hold off on MRI if not done so by then. Additionally if pt symptoms worsens would add Cefepime.     Recommendations   - c/w  CTX 2g IV q24  - c/w Doxycyline 100mg IV q12h   - Obtain MRI foot   - If fluid begins to drain obtain fluid culture  - If pt clinical status worsens switch Cefepime      ID Team 1 will continue to follow.   Pt is a 29 year old female with no PMH presents for discoloration and swelling to dorsal Right foot due to an injury in salt water after swimming in the [employer] last week when her foot was cut/bit/stung in the water. Pt remains afebrile, VSS WBC 7.88, ESR 15, CRP 6.3. Given salt water exposure would recommend covering for vibrio species as well, therefore would add Doxycycline and c/w CTX. Discoloration/numbness can also be due to toxins from bite/sting. Given the superficial nature of the metatarsal bones would recommend MRI of foot to evaluate for better evaluation of subcutaneous involvement. However, pending clinical improvement over the weekend can potentially hold off on MRI if not done so by then. Additionally if pt symptoms worsens would add Cefepime.     Recommendations   - c/w  CTX 2g IV q24  - c/w Doxycyline 100mg IV q12h   - Obtain MRI foot   - If fluid begins to drain obtain fluid culture  - If pt clinical status worsens switch CTX->Cefepime      ID Team 1 will continue to follow.   Pt is a 29 year old female with no PMH presents for discoloration and swelling to dorsal Right foot due to an injury in salt water after swimming in the [employer] last week when her foot was cut/bit/stung in the water. Pt remains afebrile, VSS WBC 7.88, ESR 15, CRP 6.3. Given salt water exposure would recommend covering for vibrio species as well, therefore would add Doxycycline and switch ancef to ceftriaxone. Discoloration/numbness can also be due to toxins from bite/sting. Given the superficial nature of the metatarsal bones would recommend MRI of foot to evaluate for better evaluation of subcutaneous involvement. However, pending clinical improvement over the weekend can potentially hold off on MRI if not done so by then. Additionally if pt symptoms worsens would add Cefepime.     Recommendations   - c/w  CTX 2g IV q24  - c/w Doxycyline 100mg IV q12h   - Obtain MRI foot   - If fluid begins to drain obtain fluid culture  - If pt exam/clinical status significantly worsens switch CTX->Cefepime 2g IV q8h and consider adding linezolid 600mg IV q12h    ID Team 1 will follow. Dr. Gannon will cover over the weekend and Dr. Cramer will resume care on Monday.

## 2024-09-06 NOTE — DISCHARGE NOTE PROVIDER - NSDCMRMEDTOKEN_GEN_ALL_CORE_FT
cefpodoxime 200 mg oral tablet: 2 tab(s) orally 2 times a day  doxycycline hyclate 100 mg oral tablet: 1 tab(s) orally 2 times a day

## 2024-09-06 NOTE — DISCHARGE NOTE PROVIDER - HOSPITAL COURSE
[ ASSESSMENT/1L (came w..., found to have...) ]     Hospital course (problem-based):    New medications:   Changes to old medications:  Items to follow up outpatient:  Physical exam at time of discharge:   29 year old female with no significant PMHx who presents for pain, discoloration and swelling to dorsal R 2nd toe likely 2/2 non-purulent cellulitis. Will admit to [facility] for IV antibiotics and further management by podiatry.       1: Cellulitis of right lower extremity.    Patient presenting w/ swollen R 2nd toe w/ a non purulent open wound after a laceration 7 days ago while swimming. CT and Xray of R foot showing swelling but no presence of abscess or gas accumulation. Podiatry evaluated patient and administered local care with plans for further evaluation. Likely non-purulent cellulitis. S/p cefazolin 1g x1, doxycycline 100mg x1, levofloxacin 750mg PO x1  inpatient tx with doxy and ctx  Plan:   **      New medications: ***  Changes to old medications: None  Items to follow up outpatient: podiatry  Physical exam at time of discharge:  General: NAD  Cardio: RRR, no murmurs  Resp: CTA, no rales/wheezes/crackles  abdomen: soft, NTND  Vasc: DP, PT 2/4. TG warm to cool. Improvement of edema to dorsal foot.   Derm: Dorsal puncture wounds to Right 2nd toe. Superficial wounds. No fluctuance. No drainage. No malodor. No PTB. Purplish discoloration to dorsal forefoot. Blanches on palpation. Interspaces are intact.   Neuro: Protective sensation intact.   MSK: -TTP to dorsal foot. No pain on ROM of 2nd toe.    29 year old female with no significant PMHx who presents for pain, discoloration and swelling to dorsal R 2nd toe likely 2/2 non-purulent cellulitis. [email or web address] for IV antibiotics and further management by podiatry.       1: Cellulitis of right lower extremity.    Patient presenting w/ swollen R 2nd toe w/ a non purulent open wound after a laceration 7 days ago while swimming. CT and Xray of R foot showing swelling but no presence of abscess or gas accumulation. Podiatry evaluated patient and administered local care with plans for further evaluation. Likely non-purulent cellulitis. S/p cefazolin 1g x1, doxycycline 100mg x1, levofloxacin 750mg PO x1  inpatient tx with doxy and ctx  Plan:   - Continue taking doxyclycline 100mg BID for 12 more days  - Start taking cefpodoxime 400mg BID for 12 more days   - Follow up outpatient with podiatry, infectious disease    New medications: doxycyline, cefpodoxime  Changes to old medications: None  Items to follow up outpatient: podiatry, ID  Physical exam at time of discharge:  General: NAD  Cardio: RRR, no murmurs  Resp: CTA, no rales/wheezes/crackles  abdomen: soft, NTND  Vasc: DP, PT 2/4. TG warm to cool. Improvement of edema to dorsal foot.   Derm: Dorsal puncture wounds to Right 2nd toe. No fluctuance. No drainage. No malodor. No PTB. Purplish discoloration to dorsal forefoot. Blanches on palpation. Interspaces are intact.   Neuro: Protective sensation intact.   MSK: -TTP to dorsal foot. No pain on ROM of 2nd toe.

## 2024-09-06 NOTE — PATIENT PROFILE ADULT - FUNCTIONAL ASSESSMENT - BASIC MOBILITY SECTION LABEL
. ===========================

Datetime: 2018 08:44

===========================

   

Nsy Prov Gen Appearance:  Within Normal Limits

Nsy Prov Skin:  Within Normal Limits; Jaundice

Nsy Prov Neuro:  Normal Tone; [place]; Grasp; Root; Suck

Nsy Prov Musculoskeletal:  Within Normal Limits; Full Range of Motion; Spontaneous Movement All Extre
mities; Intact Clavicles; Clavicles without Crepitus; Gluteal Folds Symmetrical; Spine Within Normal 
Limits; No Sacral Dimple/Cyst

Nsy Prov Head:  Normal Fontanelles; Normocephalic; Sutures WNL

Nsy Prov EENT:  Mouth Within Normal Limits; Ears Within Normal Limits; Eyes Within Normal Limits; Eye
s Red Reflex Bilaterally; Nose Within Normal Limits; Face Within Normal Limits

Nsy Prov Cardiovascular:  Within Normal Limits; Normal Pulses

Nsy Prov Respiratory:  Within Normal Limits

Nsy Prov GI:  Within Normal Limits; Soft; Normal Liver; Non Palpable Spleen; Patent Anus

Nsy Prov Umbilicus:  Within Normal Limits; Three Vessel Cord

Nsy Prov :  Normal Female Genitalia

Nsy Prov Discharge:  Discharge Home Today; Healthy Term [place]; Vital Signs Appropriate; Bonding [place]
ropriately; Voiding and Stooling; Appropriate Weight Loss; Follow Bilirubin Values

Nsy Prov Disch Comments:  Discharge baby  home today AM, breast feedig with  formula supplementation,
 do  serum bilirubin on 3/07/18, f/u in the office on 3/08/18.

Follow up in Weeks NB:  3 days

Disch Follow Up With:  

Follow up Appt with NB:  Office

   

===========================

Datetime: 2018 08:00

===========================

   

Length cms, NB:  48.50

Length in, NB:  19.09

Head Circumference (cm), NB:  34.50

   

===========================

Datetime: 2018 03:45

===========================

   

Formula Type:  Similac Advance

   

===========================

Datetime: 2018 16:23

===========================

   

Nsy Prov Skin Details:  erythema toxicum

   

===========================

Datetime: 2018 08:00

===========================

   

Lab, Bilirubin Total Serum:  9.6

Peak Bilirubin Total Serum:  10.8

Bilirubin Risk Zone:  Lower Intermediate Risk Zone 40th-75th Percentile

Blood Type:  B Positive

Lab, Direct [place]ib[place]:  Positive

 Screenin2018 08:00

   

===========================

Datetime: 2018 22:00

===========================

   

Hepatitis B Vaccine NB:  2018 00:00

   

===========================

Datetime: 2018 16:00

===========================

   

Congenital Heart Screen:  Negative, Congenital Heart Screen Complete

   

===========================

Datetime: 2018 09:00

===========================

   

Bilirubin Serum NB:  2018 06:30

   

===========================

Datetime: 2018 08:00

===========================

   

Hearing Screen Result, NB:  Right Ear Pass; Left Ear Pass

Hearing Screen Status:  Hearing Screen Complete

   

===========================

Datetime: 2018 09:52

===========================

   

Infant Birthdate and Time:  2016 07:05

Infant Sex - 1:  Female

Gestational Age at Deliv:  39 1/7

Method of Delivery:  

Vacuum Extraction:  N/A

Forceps:  N/A

Mother's Steroids Given:  None

Apgar Score 1, NB:  9

Apgar Score5, NB:  9

Maternal Amniotic Fluid Color:  Clear

Mother's Blood Type:  O Positive

Mother's Hepatitis B:  Negative

Mother's Gonorrhea:  Negative

Mother's Chlamydia:  Negative

Mother's RPR/VDRL:  Nonreactive

Mother's HIV+ Exposure Test MBL:  Negative

Mother's Hx Herpes:  No

Mother's Rubella:  Non-Immune

Mother's Group Beta Strep:  Negative

Admission Birthweight, NB:  3000

Infant Weight (lb) MBL:  6

Infant Weight (oz) MBL:  10

Maternal Feeding Preference:  Breast

   

===========================

Datetime: 2018 07:50

===========================

   

Chest Circumference, NB:  33.00

   

===========================

Datetime: 2018 07:12

===========================

   

Discharge Weight gms NB:  2886

Discharge Weight lbs NB:  6

Discharge Weight oz NB:  6

## 2024-09-06 NOTE — H&P ADULT - TIME BILLING
Evaluation of patient, review of charts, d/w ID <<----- Click to add NO significant Past Surgical History

## 2024-09-07 LAB
ANION GAP SERPL CALC-SCNC: 9 MMOL/L — SIGNIFICANT CHANGE UP (ref 5–17)
BASOPHILS # BLD AUTO: 0.04 K/UL — SIGNIFICANT CHANGE UP (ref 0–0.2)
BASOPHILS NFR BLD AUTO: 0.6 % — SIGNIFICANT CHANGE UP (ref 0–2)
BUN SERPL-MCNC: 14 MG/DL — SIGNIFICANT CHANGE UP (ref 7–23)
CALCIUM SERPL-MCNC: 9.4 MG/DL — SIGNIFICANT CHANGE UP (ref 8.4–10.5)
CHLORIDE SERPL-SCNC: 106 MMOL/L — SIGNIFICANT CHANGE UP (ref 96–108)
CO2 SERPL-SCNC: 24 MMOL/L — SIGNIFICANT CHANGE UP (ref 22–31)
CREAT SERPL-MCNC: 0.71 MG/DL — SIGNIFICANT CHANGE UP (ref 0.5–1.3)
EGFR: 119 ML/MIN/1.73M2 — SIGNIFICANT CHANGE UP
EOSINOPHIL # BLD AUTO: 0.36 K/UL — SIGNIFICANT CHANGE UP (ref 0–0.5)
EOSINOPHIL NFR BLD AUTO: 5.5 % — SIGNIFICANT CHANGE UP (ref 0–6)
GLUCOSE SERPL-MCNC: 93 MG/DL — SIGNIFICANT CHANGE UP (ref 70–99)
HCT VFR BLD CALC: 40.4 % — SIGNIFICANT CHANGE UP (ref 34.5–45)
HGB BLD-MCNC: 12.6 G/DL — SIGNIFICANT CHANGE UP (ref 11.5–15.5)
IMM GRANULOCYTES NFR BLD AUTO: 0.3 % — SIGNIFICANT CHANGE UP (ref 0–0.9)
LYMPHOCYTES # BLD AUTO: 2.01 K/UL — SIGNIFICANT CHANGE UP (ref 1–3.3)
LYMPHOCYTES # BLD AUTO: 30.7 % — SIGNIFICANT CHANGE UP (ref 13–44)
MAGNESIUM SERPL-MCNC: 2.1 MG/DL — SIGNIFICANT CHANGE UP (ref 1.6–2.6)
MCHC RBC-ENTMCNC: 31.2 GM/DL — LOW (ref 32–36)
MCHC RBC-ENTMCNC: 31.2 PG — SIGNIFICANT CHANGE UP (ref 27–34)
MCV RBC AUTO: 100 FL — SIGNIFICANT CHANGE UP (ref 80–100)
MONOCYTES # BLD AUTO: 1.04 K/UL — HIGH (ref 0–0.9)
MONOCYTES NFR BLD AUTO: 15.9 % — HIGH (ref 2–14)
NEUTROPHILS # BLD AUTO: 3.08 K/UL — SIGNIFICANT CHANGE UP (ref 1.8–7.4)
NEUTROPHILS NFR BLD AUTO: 47 % — SIGNIFICANT CHANGE UP (ref 43–77)
NRBC # BLD: 0 /100 WBCS — SIGNIFICANT CHANGE UP (ref 0–0)
PHOSPHATE SERPL-MCNC: 3.4 MG/DL — SIGNIFICANT CHANGE UP (ref 2.5–4.5)
PLATELET # BLD AUTO: 237 K/UL — SIGNIFICANT CHANGE UP (ref 150–400)
POTASSIUM SERPL-MCNC: 4 MMOL/L — SIGNIFICANT CHANGE UP (ref 3.5–5.3)
POTASSIUM SERPL-SCNC: 4 MMOL/L — SIGNIFICANT CHANGE UP (ref 3.5–5.3)
RBC # BLD: 4.04 M/UL — SIGNIFICANT CHANGE UP (ref 3.8–5.2)
RBC # FLD: 12 % — SIGNIFICANT CHANGE UP (ref 10.3–14.5)
SODIUM SERPL-SCNC: 139 MMOL/L — SIGNIFICANT CHANGE UP (ref 135–145)
WBC # BLD: 6.55 K/UL — SIGNIFICANT CHANGE UP (ref 3.8–10.5)
WBC # FLD AUTO: 6.55 K/UL — SIGNIFICANT CHANGE UP (ref 3.8–10.5)

## 2024-09-07 PROCEDURE — 73720 MRI LWR EXTREMITY W/O&W/DYE: CPT | Mod: 26,RT

## 2024-09-07 PROCEDURE — 99232 SBSQ HOSP IP/OBS MODERATE 35: CPT | Mod: GC

## 2024-09-07 PROCEDURE — 99233 SBSQ HOSP IP/OBS HIGH 50: CPT | Mod: GC

## 2024-09-07 RX ORDER — DOXYCYCLINE MONOHYDRATE 100 MG
100 TABLET ORAL EVERY 12 HOURS
Refills: 0 | Status: DISCONTINUED | OUTPATIENT
Start: 2024-09-08 | End: 2024-09-09

## 2024-09-07 RX ORDER — ACETAMINOPHEN 325 MG/1
650 TABLET ORAL ONCE
Refills: 0 | Status: COMPLETED | OUTPATIENT
Start: 2024-09-07 | End: 2024-09-07

## 2024-09-07 RX ADMIN — ACETAMINOPHEN 650 MILLIGRAM(S): 325 TABLET ORAL at 22:56

## 2024-09-07 RX ADMIN — Medication 110 MILLIGRAM(S): at 06:38

## 2024-09-07 RX ADMIN — Medication 100 MILLIGRAM(S): at 21:39

## 2024-09-07 RX ADMIN — Medication 110 MILLIGRAM(S): at 18:13

## 2024-09-07 RX ADMIN — ACETAMINOPHEN 650 MILLIGRAM(S): 325 TABLET ORAL at 21:56

## 2024-09-07 NOTE — PROGRESS NOTE ADULT - PROBLEM SELECTOR PLAN 1
Patient presenting w/ swollen R 2nd toe w/ a non purulent open wound after a laceration 7 days ago while swimming. CT and Xray of R foot showing swelling but no presence of abscess or gas accumulation.     Plan:   - C/w CTX, Doxycycline  -ID consulted  -MRI

## 2024-09-07 NOTE — PROGRESS NOTE ADULT - SUBJECTIVE AND OBJECTIVE BOX
INTERVAL HPI/OVERNIGHT EVENTS: Patient reported some tingling in her right arm from her IV which was removed and placed in her left hand. She reported feeling some lightheadedness from the discomfort in her hand with no other associated symptoms, which upon reassessment had resolved.     Patient seen and examined at bedside. Resting comfortably in bed. She reports that her R foot swelling is decreasing from prior. She experiences some foot pain while walking but otherwise denies. She has lower appetite than usual but is able to eat her meals in small portions. She is having regular bowel movements. She offers no acute complaints at this time. Denies fatigue, chills, chest pain, abdominal pain, cough, N/V/D, or other pertinent Sx on ROS.    CONSTITUTIONAL:  No fever, chills, night sweats  EYES:  No photophobia or visual changes  CARDIOVASCULAR:  No chest pain  RESPIRATORY:  No cough, wheezing, or SOB   GASTROINTESTINAL:  No nausea, vomiting, diarrhea, constipation, or abdominal pain  GENITOURINARY:  No frequency, urgency, dysuria or hematuria  NEUROLOGIC:  No headache, lightheadedness      ANTIBIOTICS/RELEVANT:  ceFAZolin   IVPB   100 mL/Hr IV Intermittent (09-05-24 @ 19:55)    ceFAZolin   IVPB   100 mL/Hr IV Intermittent (09-06-24 @ 07:50)    ceFAZolin   IVPB   100 mL/Hr IV Intermittent (09-06-24 @ 14:08)    cefTRIAXone   IVPB   100 mL/Hr IV Intermittent (09-06-24 @ 21:36)    doxycycline IVPB   110 mL/Hr IV Intermittent (09-07-24 @ 06:38)   110 mL/Hr IV Intermittent (09-06-24 @ 17:00)    doxycycline IVPB   110 mL/Hr IV Intermittent (09-05-24 @ 20:09)    levoFLOXacin  Tablet   750 milliGRAM(s) Oral (09-05-24 @ 19:55)          Vital Signs Last 24 Hrs  T(C): 36.9 (07 Sep 2024 11:54), Max: 36.9 (06 Sep 2024 20:47)  T(F): 98.5 (07 Sep 2024 11:54), Max: 98.5 (06 Sep 2024 20:47)  HR: 65 (07 Sep 2024 11:54) (65 - 77)  BP: 111/69 (07 Sep 2024 11:54) (93/66 - 111/69)  BP(mean): 75 (06 Sep 2024 20:47) (75 - 75)  RR: 17 (07 Sep 2024 11:54) (17 - 19)  SpO2: 100% (07 Sep 2024 11:54) (100% - 100%)    Parameters below as of 07 Sep 2024 11:54  Patient On (Oxygen Delivery Method): room air        PHYSICAL EXAM:  Constitutional: NAD  Eyes: Sclerae anicteric, conjunctivae clear, EOMI  Ear/Nose/Throat: No nasal exudate. MMM.  Neck: Supple  Respiratory: Lungs CTA B/L, no wheezes, rhonchi, or rales.  Cardiovascular: RRR, +S1/S2, no murmurs appreciated  Gastrointestinal: Abdomen soft NTND, bowel sounds present.  Extremities: R foot with superficial puncture wound at dorsum of forefoot and R 2nd toe. Purple discoloration at forefoot, however no purulence/drainage/foul-smell or TTP. Reduced erythema/edema extending to R medial foot, decreased within demarcated borders.      LABS:                        12.6   6.55  )-----------( 237      ( 07 Sep 2024 05:30 )             40.4         09-07    139  |  106  |  14  ----------------------------<  93  4.0   |  24  |  0.71    Ca    9.4      07 Sep 2024 05:30  Phos  3.4     09-07  Mg     2.1     09-07    TPro  7.1  /  Alb  4.2  /  TBili  0.4  /  DBili  x   /  AST  15  /  ALT  16  /  AlkPhos  72  09-06      Urinalysis Basic - ( 07 Sep 2024 05:30 )    Color: x / Appearance: x / SG: x / pH: x  Gluc: 93 mg/dL / Ketone: x  / Bili: x / Urobili: x   Blood: x / Protein: x / Nitrite: x   Leuk Esterase: x / RBC: x / WBC x   Sq Epi: x / Non Sq Epi: x / Bacteria: x        MICROBIOLOGY:  None      RADIOLOGY & ADDITIONAL STUDIES:  CT R Foot (9/5/24):    IMPRESSION:  Dorsal foot subcutaneous edema, which may be seen in cellulitis. No   evidence of soft tissue abscess, osteomyelitis, or soft tissue gas.

## 2024-09-07 NOTE — PROGRESS NOTE ADULT - ASSESSMENT
29 year old female with no PMH presents for discoloration and swelling to dorsal Right foot due to an injury in salt water. Says she was swimming in the [place] last week when her foot was cut in the water. She isn't sure what it was, but thinks it was a sting ray. Pt has dorsal puncture wounds to Right 2nd toe. Superficial wounds. No fluctuance. No drainage. No malodor. No PTB. Purplish discoloration to dorsal forefoot. Blanches on palpation. Interspaces are intact. At time of consult, WBC 10.3, ESR 15, CRP 6.3. VSS. There is no evidence of foreign body, no gas, no fracture as seen on Xray. Concern for abnormally high swelling of Right foot. Improving of edema to dorsal foot.     9/7: Major improvement in edema and erythema since admission    Plan:    -Abx per ID  -Dressing changes, local wound care, monitoring edema, and monitoring of cardinal signs of infection.  -Offloading/WB status: WBAT in surgical shoe   -Right lower extremity should be placed in a elevated position.  -Wounds cleansed with betadine flush.  -Dressed with betadine paint, DSD, ACE.   -Rest of care up to primary team      Podiatry following, Plan discussed with attending.    Discharge dressing instructions: Cleanse wound with normal sailine daily, pat dry with sterile guaze, apply  betadine soaked gauze to wound base, cover with dry sterile gauze, ABD pad, wrap with Kerlix and light ACE bandage.     Patient should follow up with Dr. Kasandra Paulson within 1 week of discharge.    Office information:          [place] Address- [address], [place] [address] Phone: [phone]         [place] Address- [address] [address] Phone: [phone]

## 2024-09-07 NOTE — PROGRESS NOTE ADULT - ASSESSMENT
Pt is a 29 year old female with no PMH presents for discoloration and swelling to dorsal Right foot due to an injury in salt water after swimming in the [employer] last week when her foot was cut/bit/stung in the water. Pt remains afebrile, VSS WBC 7.88, ESR 15, CRP 6.3. Given salt water exposure would recommend covering for vibrio species as well, therefore would add Doxycycline and switch ancef to ceftriaxone. Discoloration/numbness can also be due to toxins from bite/sting. Given the superficial nature of the metatarsal bones would recommend MRI of foot to evaluate for better evaluation of subcutaneous involvement. However, pending clinical improvement over the weekend can potentially hold off on MRI if not done so by then. Additionally if pt symptoms worsens would add Cefepime.     Recommendations   - c/w  CTX 2g IV q24  - c/w Doxycyline 100mg IV q12h   - Obtain MRI foot   - f/u Blood Cx (9/6, specimen received)  - If fluid begins to drain obtain fluid culture  - If pt exam/clinical status significantly worsens switch CTX->Cefepime 2g IV q8h and consider adding linezolid 600mg IV q12h    ID Team 1 will follow. Dr. Gannon will cover over the weekend and Dr. Cramer will resume care on Monday. Pt is a 29 year old female with no PMH presents for discoloration and swelling to dorsal Right foot due to an injury in salt water after swimming in the [employer] last week when her foot was cut/bit/stung in the water. Pt remains afebrile, VSS WBC 6.55, ESR 15, CRP 6.3. Given salt water exposure recommended covering for vibrio species as well, therefore added Doxycycline and switched ancef to ceftriaxone. Discoloration/numbness can also be due to toxins from bite/sting. Given the superficial nature of the metatarsal bones would recommend MRI of foot to evaluate for better evaluation of subcutaneous involvement. However, pending clinical improvement over the weekend can potentially hold off on MRI if not done so by then. Additionally if pt symptoms worsens would add Cefepime.     Recommendations   - c/w  CTX 2g IV q24  - c/w Doxycyline 100mg IV q12h   - Obtain MRI foot   - f/u Blood Cx (9/6, specimen received)  - If fluid begins to drain obtain fluid culture  - If pt exam/clinical status significantly worsens switch CTX->Cefepime 2g IV q8h and consider adding linezolid 600mg IV q12h    ID Team 1 will follow. Dr. Gannon will cover over the weekend and Dr. Cramer will resume care on Monday.

## 2024-09-07 NOTE — PROGRESS NOTE ADULT - ASSESSMENT
29 year old female with no significant PMHx who presents for pain, discoloration and swelling to dorsal R 2nd toe likely 2/2 non-purulent cellulitis. Will admit to [facility] for IV antibiotics and further management by podiatry.

## 2024-09-07 NOTE — PROGRESS NOTE ADULT - SUBJECTIVE AND OBJECTIVE BOX
Patient is a 29y old  Female who presents with a chief complaint of R 2nd toe cellulitis (06 Sep 2024 17:53)      INTERVAL HPI/ OVERNIGHT EVENTS    Dressing C/D/I. No acute events.       LABS                        12.6   6.55  )-----------( 237      ( 07 Sep 2024 05:30 )             40.4     09-07    139  |  106  |  14  ----------------------------<  93  4.0   |  24  |  0.71    Ca    9.4      07 Sep 2024 05:30  Phos  3.4     09-07  Mg     2.1     09-07    TPro  7.1  /  Alb  4.2  /  TBili  0.4  /  DBili  x   /  AST  15  /  ALT  16  /  AlkPhos  72  09-06        ICU Vital Signs Last 24 Hrs  T(C): 36.9 (07 Sep 2024 11:54), Max: 36.9 (06 Sep 2024 20:47)  T(F): 98.5 (07 Sep 2024 11:54), Max: 98.5 (06 Sep 2024 20:47)  HR: 65 (07 Sep 2024 11:54) (65 - 80)  BP: 111/69 (07 Sep 2024 11:54) (93/56 - 111/69)  BP(mean): 75 (06 Sep 2024 20:47) (69 - 75)  ABP: --  ABP(mean): --  RR: 17 (07 Sep 2024 11:54) (17 - 19)  SpO2: 100% (07 Sep 2024 11:54) (98% - 100%)    O2 Parameters below as of 07 Sep 2024 11:54  Patient On (Oxygen Delivery Method): room air          RADIOLOGY    < from: CT Foot w/ IV Cont, Right (09.05.24 @ 22:22) >  IMPRESSION:  Dorsal foot subcutaneous edema, which may be seen in cellulitis. No   evidence of soft tissue abscess, osteomyelitis, or soft tissue gas.    --- End of Report ---    < end of copied text >    < from: Xray Foot AP + Lateral + Oblique, Right (09.05.24 @ 19:47) >  IMPRESSION: Negative for an acute displaced fracture or dislocation. The   joint spaces are preserved. There is no evidence of a radiopaque foreign   body.    --- End of Report ---    < end of copied text >    MICROBIOLOGY    PHYSICAL EXAM  Lower Extremity Focused  Vasc: DP, PT 2/4. TG warm to cool. Improvement of edema to dorsal foot. No erythema  Derm: Dorsal puncture wounds to Right 2nd toe. Superficial wounds. No fluctuance. No drainage. No malodor. No PTB. Purplish discoloration to dorsal forefoot. Blanches on palpation. Interspaces are intact.   Neuro: Protective sensation intact.   MSK: -TTP to dorsal foot. No pain on ROM of 2nd toe.

## 2024-09-08 LAB
ANION GAP SERPL CALC-SCNC: 10 MMOL/L — SIGNIFICANT CHANGE UP (ref 5–17)
BASOPHILS # BLD AUTO: 0.06 K/UL — SIGNIFICANT CHANGE UP (ref 0–0.2)
BASOPHILS NFR BLD AUTO: 0.8 % — SIGNIFICANT CHANGE UP (ref 0–2)
BUN SERPL-MCNC: 15 MG/DL — SIGNIFICANT CHANGE UP (ref 7–23)
CALCIUM SERPL-MCNC: 9.8 MG/DL — SIGNIFICANT CHANGE UP (ref 8.4–10.5)
CHLORIDE SERPL-SCNC: 105 MMOL/L — SIGNIFICANT CHANGE UP (ref 96–108)
CO2 SERPL-SCNC: 26 MMOL/L — SIGNIFICANT CHANGE UP (ref 22–31)
CREAT SERPL-MCNC: 0.74 MG/DL — SIGNIFICANT CHANGE UP (ref 0.5–1.3)
EGFR: 114 ML/MIN/1.73M2 — SIGNIFICANT CHANGE UP
EOSINOPHIL # BLD AUTO: 0.33 K/UL — SIGNIFICANT CHANGE UP (ref 0–0.5)
EOSINOPHIL NFR BLD AUTO: 4.4 % — SIGNIFICANT CHANGE UP (ref 0–6)
GLUCOSE SERPL-MCNC: 92 MG/DL — SIGNIFICANT CHANGE UP (ref 70–99)
HCT VFR BLD CALC: 39.2 % — SIGNIFICANT CHANGE UP (ref 34.5–45)
HGB BLD-MCNC: 12.7 G/DL — SIGNIFICANT CHANGE UP (ref 11.5–15.5)
IMM GRANULOCYTES NFR BLD AUTO: 0.3 % — SIGNIFICANT CHANGE UP (ref 0–0.9)
LYMPHOCYTES # BLD AUTO: 2.25 K/UL — SIGNIFICANT CHANGE UP (ref 1–3.3)
LYMPHOCYTES # BLD AUTO: 30 % — SIGNIFICANT CHANGE UP (ref 13–44)
MAGNESIUM SERPL-MCNC: 2.1 MG/DL — SIGNIFICANT CHANGE UP (ref 1.6–2.6)
MCHC RBC-ENTMCNC: 30.8 PG — SIGNIFICANT CHANGE UP (ref 27–34)
MCHC RBC-ENTMCNC: 32.4 GM/DL — SIGNIFICANT CHANGE UP (ref 32–36)
MCV RBC AUTO: 94.9 FL — SIGNIFICANT CHANGE UP (ref 80–100)
MONOCYTES # BLD AUTO: 0.73 K/UL — SIGNIFICANT CHANGE UP (ref 0–0.9)
MONOCYTES NFR BLD AUTO: 9.7 % — SIGNIFICANT CHANGE UP (ref 2–14)
NEUTROPHILS # BLD AUTO: 4.1 K/UL — SIGNIFICANT CHANGE UP (ref 1.8–7.4)
NEUTROPHILS NFR BLD AUTO: 54.8 % — SIGNIFICANT CHANGE UP (ref 43–77)
NRBC # BLD: 0 /100 WBCS — SIGNIFICANT CHANGE UP (ref 0–0)
PHOSPHATE SERPL-MCNC: 3.8 MG/DL — SIGNIFICANT CHANGE UP (ref 2.5–4.5)
PLATELET # BLD AUTO: 252 K/UL — SIGNIFICANT CHANGE UP (ref 150–400)
POTASSIUM SERPL-MCNC: 3.6 MMOL/L — SIGNIFICANT CHANGE UP (ref 3.5–5.3)
POTASSIUM SERPL-SCNC: 3.6 MMOL/L — SIGNIFICANT CHANGE UP (ref 3.5–5.3)
RBC # BLD: 4.13 M/UL — SIGNIFICANT CHANGE UP (ref 3.8–5.2)
RBC # FLD: 12 % — SIGNIFICANT CHANGE UP (ref 10.3–14.5)
SODIUM SERPL-SCNC: 141 MMOL/L — SIGNIFICANT CHANGE UP (ref 135–145)
WBC # BLD: 7.49 K/UL — SIGNIFICANT CHANGE UP (ref 3.8–10.5)
WBC # FLD AUTO: 7.49 K/UL — SIGNIFICANT CHANGE UP (ref 3.8–10.5)

## 2024-09-08 PROCEDURE — 99232 SBSQ HOSP IP/OBS MODERATE 35: CPT | Mod: GC

## 2024-09-08 PROCEDURE — 93010 ELECTROCARDIOGRAM REPORT: CPT

## 2024-09-08 RX ORDER — POTASSIUM CHLORIDE 10 MEQ
40 TABLET, EXT RELEASE, PARTICLES/CRYSTALS ORAL ONCE
Refills: 0 | Status: COMPLETED | OUTPATIENT
Start: 2024-09-08 | End: 2024-09-08

## 2024-09-08 RX ORDER — ENOXAPARIN SODIUM 100 MG/ML
40 INJECTION SUBCUTANEOUS EVERY 24 HOURS
Refills: 0 | Status: DISCONTINUED | OUTPATIENT
Start: 2024-09-08 | End: 2024-09-09

## 2024-09-08 RX ADMIN — Medication 100 MILLIGRAM(S): at 06:05

## 2024-09-08 RX ADMIN — ENOXAPARIN SODIUM 40 MILLIGRAM(S): 100 INJECTION SUBCUTANEOUS at 12:08

## 2024-09-08 RX ADMIN — Medication 100 MILLIGRAM(S): at 21:34

## 2024-09-08 RX ADMIN — Medication 100 MILLIGRAM(S): at 18:40

## 2024-09-08 RX ADMIN — Medication 40 MILLIEQUIVALENT(S): at 09:57
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
## 2024-09-08 NOTE — PROGRESS NOTE ADULT - SUBJECTIVE AND OBJECTIVE BOX
Patient is a 29y old  Female who presents with a chief complaint of R 2nd toe cellulitis (08 Sep 2024 11:47)      INTERVAL HPI/ OVERNIGHT EVENTS  Dressing C/D/I. No acute events.       LABS                        12.7   7.49  )-----------( 252      ( 08 Sep 2024 05:30 )             39.2     09-08    141  |  105  |  15  ----------------------------<  92  3.6   |  26  |  0.74    Ca    9.8      08 Sep 2024 05:30  Phos  3.8     09-08  Mg     2.1     09-08          ICU Vital Signs Last 24 Hrs  T(C): 36.8 (08 Sep 2024 10:20), Max: 36.8 (08 Sep 2024 10:20)  T(F): 98.3 (08 Sep 2024 10:20), Max: 98.3 (08 Sep 2024 10:20)  HR: 79 (08 Sep 2024 10:20) (69 - 79)  BP: 102/66 (08 Sep 2024 10:20) (102/66 - 109/74)  BP(mean): 86 (08 Sep 2024 05:19) (86 - 86)  ABP: --  ABP(mean): --  RR: 18 (08 Sep 2024 10:20) (18 - 18)  SpO2: 98% (08 Sep 2024 10:20) (98% - 100%)    O2 Parameters below as of 08 Sep 2024 10:20  Patient On (Oxygen Delivery Method): room air      RADIOLOGY  < from: CT Foot w/ IV Cont, Right (09.05.24 @ 22:22) >  IMPRESSION:  Dorsal foot subcutaneous edema, which may be seen in cellulitis. No   evidence of soft tissue abscess, osteomyelitis, or soft tissue gas.    --- End of Report ---    < end of copied text >    < from: Xray Foot AP + Lateral + Oblique, Right (09.05.24 @ 19:47) >  IMPRESSION: Negative for an acute displaced fracture or dislocation. The   joint spaces are preserved. There is no evidence of a radiopaque foreign   body.    --- End of Report ---    < end of copied text >      < from: MR Foot w/wo IV Cont, Right (09.07.24 @ 16:39) >  IMPRESSION:  Prominent localized soft tissue edema and enhancement within the   interosseous muscles surrounding the second metatarsal head/neck,   findings which may be posttraumatic or infectious. Small second MTP joint   effusion is also present which may be reactive or infectious. No MRI   evidence for osteomyelitis or abscess. No MRI evidence for foreign body.    --- End of Report ---    < end of copied text >      MICROBIOLOGY    PHYSICAL EXAM  PHYSICAL EXAM  Lower Extremity Focused  Vasc: DP, PT 2/4. TG warm to cool. Improvement of edema to dorsal foot. medial foot erythema  Derm: Dorsal puncture wounds to Right 2nd toe. Superficial wounds. No fluctuance. No drainage. No malodor. No PTB. Purplish discoloration to dorsal forefoot. Blanches on palpation. Interspaces are intact.   Neuro: Protective sensation intact.   MSK: -TTP to dorsal foot. No pain on ROM of 2nd toe.

## 2024-09-08 NOTE — PROGRESS NOTE ADULT - SUBJECTIVE AND OBJECTIVE BOX
INTERVAL HPI/OVERNIGHT EVENTS: RILEYO    Patient seen and examined at bedside. Resting comfortably in bed. She reports that her R foot swelling continues to improve. She experiences some foot pain while walking but otherwise feels better. She has lower appetite than usual but is able to eat her meals in small portions. She is having regular bowel movements. She offers no acute complaints at this time. Denies fatigue, chills, chest pain, abdominal pain, cough, N/V/D, or other pertinent Sx on ROS.    CONSTITUTIONAL:  No fever, chills, night sweats  EYES:  No photophobia or visual changes  CARDIOVASCULAR:  No chest pain  RESPIRATORY:  No cough, wheezing, or SOB   GASTROINTESTINAL:  No nausea, vomiting, diarrhea, constipation, or abdominal pain  GENITOURINARY:  No frequency, urgency, dysuria or hematuria  NEUROLOGIC:  No headache, lightheadedness      ANTIBIOTICS/RELEVANT:  ceFAZolin   IVPB   100 mL/Hr IV Intermittent (09-05-24 @ 19:55)    ceFAZolin   IVPB   100 mL/Hr IV Intermittent (09-06-24 @ 07:50)    ceFAZolin   IVPB   100 mL/Hr IV Intermittent (09-06-24 @ 14:08)    cefTRIAXone   IVPB   100 mL/Hr IV Intermittent (09-06-24 @ 21:36)    doxycycline IVPB   110 mL/Hr IV Intermittent (09-07-24 @ 06:38)   110 mL/Hr IV Intermittent (09-06-24 @ 17:00)    doxycycline IVPB   110 mL/Hr IV Intermittent (09-05-24 @ 20:09)    levoFLOXacin  Tablet   750 milliGRAM(s) Oral (09-05-24 @ 19:55)          Vital Signs Last 24 Hrs  T(C): 36.9 (07 Sep 2024 11:54), Max: 36.9 (06 Sep 2024 20:47)  T(F): 98.5 (07 Sep 2024 11:54), Max: 98.5 (06 Sep 2024 20:47)  HR: 65 (07 Sep 2024 11:54) (65 - 77)  BP: 111/69 (07 Sep 2024 11:54) (93/66 - 111/69)  BP(mean): 75 (06 Sep 2024 20:47) (75 - 75)  RR: 17 (07 Sep 2024 11:54) (17 - 19)  SpO2: 100% (07 Sep 2024 11:54) (100% - 100%)    Parameters below as of 07 Sep 2024 11:54  Patient On (Oxygen Delivery Method): room air        PHYSICAL EXAM:  Constitutional: NAD  Eyes: Sclerae anicteric, conjunctivae clear, EOMI  Ear/Nose/Throat: No nasal exudate. MMM.  Neck: Supple  Respiratory: Lungs CTA B/L, no wheezes, rhonchi, or rales.  Cardiovascular: RRR, +S1/S2, no murmurs appreciated  Gastrointestinal: Abdomen soft NTND, bowel sounds present.  Extremities: R foot with superficial puncture wound at dorsum of forefoot and R 2nd toe. Purple discoloration at forefoot, however no purulence/drainage/foul-smell or TTP. Reduced erythema/edema extending to R medial foot, decreased within demarcated borders.      LABS:                        12.6   6.55  )-----------( 237      ( 07 Sep 2024 05:30 )             40.4         09-07    139  |  106  |  14  ----------------------------<  93  4.0   |  24  |  0.71    Ca    9.4      07 Sep 2024 05:30  Phos  3.4     09-07  Mg     2.1     09-07    TPro  7.1  /  Alb  4.2  /  TBili  0.4  /  DBili  x   /  AST  15  /  ALT  16  /  AlkPhos  72  09-06      Urinalysis Basic - ( 07 Sep 2024 05:30 )    Color: x / Appearance: x / SG: x / pH: x  Gluc: 93 mg/dL / Ketone: x  / Bili: x / Urobili: x   Blood: x / Protein: x / Nitrite: x   Leuk Esterase: x / RBC: x / WBC x   Sq Epi: x / Non Sq Epi: x / Bacteria: x        MICROBIOLOGY:  None      RADIOLOGY & ADDITIONAL STUDIES:  CT R Foot (9/5/24):    IMPRESSION:  Dorsal foot subcutaneous edema, which may be seen in cellulitis. No   evidence of soft tissue abscess, osteomyelitis, or soft tissue gas.   INTERVAL HPI/OVERNIGHT EVENTS: NAEO    Patient seen and examined at bedside. Resting comfortably in bed. She reports that her R foot swelling continues to improve although still painful on ambulation and w persistent toe swelling. Denies fatigue, chills, chest pain, abdominal pain, cough, N/V/D, or other pertinent Sx on ROS.      ANTIBIOTICS/RELEVANT:  ceftriaxone IVPB  doxycycline IVPB      Vital Signs Last 24 Hrs  T(C): 36.9 (07 Sep 2024 11:54), Max: 36.9 (06 Sep 2024 20:47)  T(F): 98.5 (07 Sep 2024 11:54), Max: 98.5 (06 Sep 2024 20:47)  HR: 65 (07 Sep 2024 11:54) (65 - 77)  BP: 111/69 (07 Sep 2024 11:54) (93/66 - 111/69)  BP(mean): 75 (06 Sep 2024 20:47) (75 - 75)  RR: 17 (07 Sep 2024 11:54) (17 - 19)  SpO2: 100% (07 Sep 2024 11:54) (100% - 100%)    Parameters below as of 07 Sep 2024 11:54  Patient On (Oxygen Delivery Method): room air        PHYSICAL EXAM:  Constitutional: NAD  Eyes: Sclerae anicteric, conjunctivae clear, EOMI  Ear/Nose/Throat: No nasal exudate. MMM.  Neck: Supple  Respiratory: Lungs CTA B/L, no wheezes, rhonchi, or rales.  Cardiovascular: RRR, +S1/S2, no murmurs appreciated  Gastrointestinal: Abdomen soft NTND, bowel sounds present.  Extremities: R foot with superficial puncture wound at dorsum of forefoot and R 2nd toe. Purple discoloration at forefoot, however no purulence/drainage/foul-smell or TTP. Reduced erythema/edema extending to R medial foot, decreased within demarcated borders.      LABS:                        12.6   6.55  )-----------( 237      ( 07 Sep 2024 05:30 )             40.4         09-07    139  |  106  |  14  ----------------------------<  93  4.0   |  24  |  0.71    Ca    9.4      07 Sep 2024 05:30  Phos  3.4     09-07  Mg     2.1     09-07    TPro  7.1  /  Alb  4.2  /  TBili  0.4  /  DBili  x   /  AST  15  /  ALT  16  /  AlkPhos  72  09-06      Urinalysis Basic - ( 07 Sep 2024 05:30 )    Color: x / Appearance: x / SG: x / pH: x  Gluc: 93 mg/dL / Ketone: x  / Bili: x / Urobili: x   Blood: x / Protein: x / Nitrite: x   Leuk Esterase: x / RBC: x / WBC x   Sq Epi: x / Non Sq Epi: x / Bacteria: x        MICROBIOLOGY:  None      RADIOLOGY & ADDITIONAL STUDIES:  CT R Foot (9/5/24):    IMPRESSION:  Dorsal foot subcutaneous edema, which may be seen in cellulitis. No   evidence of soft tissue abscess, osteomyelitis, or soft tissue gas.

## 2024-09-08 NOTE — PROGRESS NOTE ADULT - PROBLEM SELECTOR PLAN 2
N: regular diet  E: replete as necessary  DVT: improve score 0; does not need  PPI: none  Code: full code  Dispo: 7U N: regular diet  E: replete as necessary  DVT: Lovenox 40mg q24h  PPI: none  Code: full code  Dispo: 7U
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
## 2024-09-08 NOTE — PROGRESS NOTE ADULT - ASSESSMENT
29 year old female with no significant PMHx who presents for pain, discoloration and swelling to dorsal R 2nd toe likely 2/2 non-purulent cellulitis. Will admit to [facility] for IV antibiotics and further management by podiatry.

## 2024-09-08 NOTE — PROGRESS NOTE ADULT - SUBJECTIVE AND OBJECTIVE BOX
CC: Patient is a 29y old  Female who presents with a chief complaint of R 2nd toe cellulitis (07 Sep 2024 16:04)      INTERVAL EVENTS: RILEY    SUBJECTIVE / INTERVAL HPI: Patient seen and examined at bedside.     ROS: negative unless otherwise stated above.    VITAL SIGNS:  Vital Signs Last 24 Hrs  T(C): 36.7 (08 Sep 2024 05:19), Max: 36.9 (07 Sep 2024 11:54)  T(F): 98 (08 Sep 2024 05:19), Max: 98.5 (07 Sep 2024 11:54)  HR: 73 (08 Sep 2024 05:19) (65 - 73)  BP: 109/74 (08 Sep 2024 05:19) (103/69 - 111/69)  BP(mean): 86 (08 Sep 2024 05:19) (86 - 86)  RR: 18 (08 Sep 2024 05:19) (17 - 18)  SpO2: 99% (08 Sep 2024 05:19) (99% - 100%)    Parameters below as of 08 Sep 2024 05:19  Patient On (Oxygen Delivery Method): room air            PHYSICAL EXAM:    General: NAD  HEENT: MMM  Neck: supple  Cardiovascular: +S1/S2; RRR  Respiratory: CTA B/L; no W/R/R  Gastrointestinal: soft, NT/ND  Extremities: WWP; no edema, clubbing or cyanosis  Vascular: 2+ radial, DP/PT pulses B/L  Neurological: AAOx3; no focal deficits    MEDICATIONS:  MEDICATIONS  (STANDING):  cefTRIAXone   IVPB 2000 milliGRAM(s) IV Intermittent every 24 hours  doxycycline monohydrate Capsule 100 milliGRAM(s) Oral every 12 hours    MEDICATIONS  (PRN):      ALLERGIES:  Allergies    No Known Allergies    Intolerances        LABS:                        12.7   7.49  )-----------( 252      ( 08 Sep 2024 05:30 )             39.2     09-08    141  |  105  |  15  ----------------------------<  92  3.6   |  26  |  0.74    Ca    9.8      08 Sep 2024 05:30  Phos  3.8     09-08  Mg     2.1     09-08        Urinalysis Basic - ( 08 Sep 2024 05:30 )    Color: x / Appearance: x / SG: x / pH: x  Gluc: 92 mg/dL / Ketone: x  / Bili: x / Urobili: x   Blood: x / Protein: x / Nitrite: x   Leuk Esterase: x / RBC: x / WBC x   Sq Epi: x / Non Sq Epi: x / Bacteria: x      CAPILLARY BLOOD GLUCOSE          RADIOLOGY & ADDITIONAL TESTS: Reviewed. CC: Patient is a 29y old  Female who presents with a chief complaint of R 2nd toe cellulitis.    INTERVAL EVENTS: RILEY    SUBJECTIVE / INTERVAL HPI: Patient seen and examined at bedside. She was with nausea after taking doxy, however improved after sitting up for a while. Her R foot swelling is improving, no pain.     ROS: negative unless otherwise stated above.    VITAL SIGNS:  Vital Signs Last 24 Hrs  T(C): 36.7 (08 Sep 2024 05:19), Max: 36.9 (07 Sep 2024 11:54)  T(F): 98 (08 Sep 2024 05:19), Max: 98.5 (07 Sep 2024 11:54)  HR: 73 (08 Sep 2024 05:19) (65 - 73)  BP: 109/74 (08 Sep 2024 05:19) (103/69 - 111/69)  BP(mean): 86 (08 Sep 2024 05:19) (86 - 86)  RR: 18 (08 Sep 2024 05:19) (17 - 18)  SpO2: 99% (08 Sep 2024 05:19) (99% - 100%)    Parameters below as of 08 Sep 2024 05:19  Patient On (Oxygen Delivery Method): room air      PHYSICAL EXAM:    General: NAD  HEENT: MMM  Neck: supple  Cardiovascular: +S1/S2; RRR  Respiratory: CTA B/L; no W/R/R  Gastrointestinal: soft, NT/ND  Extremities: WWP; no edema, clubbing or cyanosis; R foot with puncture wounds on dorsum of 2nd toe, mild swelling  Vascular: 2+ radial, DP/PT pulses B/L  Neurological: AAOx3; no focal deficits    MEDICATIONS:  MEDICATIONS  (STANDING):  cefTRIAXone   IVPB 2000 milliGRAM(s) IV Intermittent every 24 hours  doxycycline monohydrate Capsule 100 milliGRAM(s) Oral every 12 hours    MEDICATIONS  (PRN):      ALLERGIES:  Allergies    No Known Allergies    Intolerances        LABS:                        12.7   7.49  )-----------( 252      ( 08 Sep 2024 05:30 )             39.2     09-08    141  |  105  |  15  ----------------------------<  92  3.6   |  26  |  0.74    Ca    9.8      08 Sep 2024 05:30  Phos  3.8     09-08  Mg     2.1     09-08        Urinalysis Basic - ( 08 Sep 2024 05:30 )    Color: x / Appearance: x / SG: x / pH: x  Gluc: 92 mg/dL / Ketone: x  / Bili: x / Urobili: x   Blood: x / Protein: x / Nitrite: x   Leuk Esterase: x / RBC: x / WBC x   Sq Epi: x / Non Sq Epi: x / Bacteria: x      CAPILLARY BLOOD GLUCOSE          RADIOLOGY & ADDITIONAL TESTS: Reviewed. CC: Patient is a 29y old  Female who presents with a chief complaint of R 2nd toe cellulitis.    INTERVAL EVENTS: RILEY    SUBJECTIVE / INTERVAL HPI: Patient seen and examined at bedside. She was with nausea after taking doxy, however improved after sitting up for a while. Her R foot swelling is improving, no pain.     ROS: negative unless otherwise stated above.    VITAL SIGNS:  Vital Signs Last 24 Hrs  T(C): 36.7 (08 Sep 2024 05:19), Max: 36.9 (07 Sep 2024 11:54)  T(F): 98 (08 Sep 2024 05:19), Max: 98.5 (07 Sep 2024 11:54)  HR: 73 (08 Sep 2024 05:19) (65 - 73)  BP: 109/74 (08 Sep 2024 05:19) (103/69 - 111/69)  BP(mean): 86 (08 Sep 2024 05:19) (86 - 86)  RR: 18 (08 Sep 2024 05:19) (17 - 18)  SpO2: 99% (08 Sep 2024 05:19) (99% - 100%)    Parameters below as of 08 Sep 2024 05:19  Patient On (Oxygen Delivery Method): room air      PHYSICAL EXAM:    General: NAD  HEENT: MMM  Neck: supple  Cardiovascular: +S1/S2; RRR  Respiratory: CTA B/L; no W/R/R  Gastrointestinal: soft, NT/ND  Extremities: WWP; no edema, clubbing or cyanosis; +R foot with puncture wounds, erythema on dorsum of 2nd toe, mild swelling  Vascular: 2+ radial, DP/PT pulses B/L  Neurological: AAOx3; no focal deficits    MEDICATIONS:  MEDICATIONS  (STANDING):  cefTRIAXone   IVPB 2000 milliGRAM(s) IV Intermittent every 24 hours  doxycycline monohydrate Capsule 100 milliGRAM(s) Oral every 12 hours    MEDICATIONS  (PRN):      ALLERGIES:  Allergies    No Known Allergies    Intolerances        LABS:                        12.7   7.49  )-----------( 252      ( 08 Sep 2024 05:30 )             39.2     09-08    141  |  105  |  15  ----------------------------<  92  3.6   |  26  |  0.74    Ca    9.8      08 Sep 2024 05:30  Phos  3.8     09-08  Mg     2.1     09-08        Urinalysis Basic - ( 08 Sep 2024 05:30 )    Color: x / Appearance: x / SG: x / pH: x  Gluc: 92 mg/dL / Ketone: x  / Bili: x / Urobili: x   Blood: x / Protein: x / Nitrite: x   Leuk Esterase: x / RBC: x / WBC x   Sq Epi: x / Non Sq Epi: x / Bacteria: x      CAPILLARY BLOOD GLUCOSE          RADIOLOGY & ADDITIONAL TESTS: Reviewed.

## 2024-09-09 ENCOUNTER — TRANSCRIPTION ENCOUNTER (OUTPATIENT)
Age: 29
End: 2024-09-09

## 2024-09-09 VITALS
HEART RATE: 76 BPM | DIASTOLIC BLOOD PRESSURE: 68 MMHG | OXYGEN SATURATION: 99 % | TEMPERATURE: 99 F | SYSTOLIC BLOOD PRESSURE: 101 MMHG | RESPIRATION RATE: 17 BRPM

## 2024-09-09 PROBLEM — Z78.9 OTHER SPECIFIED HEALTH STATUS: Chronic | Status: ACTIVE | Noted: 2024-09-06

## 2024-09-09 LAB
ANION GAP SERPL CALC-SCNC: 7 MMOL/L — SIGNIFICANT CHANGE UP (ref 5–17)
BUN SERPL-MCNC: 11 MG/DL — SIGNIFICANT CHANGE UP (ref 7–23)
CALCIUM SERPL-MCNC: 9.5 MG/DL — SIGNIFICANT CHANGE UP (ref 8.4–10.5)
CHLORIDE SERPL-SCNC: 105 MMOL/L — SIGNIFICANT CHANGE UP (ref 96–108)
CO2 SERPL-SCNC: 27 MMOL/L — SIGNIFICANT CHANGE UP (ref 22–31)
CREAT SERPL-MCNC: 0.71 MG/DL — SIGNIFICANT CHANGE UP (ref 0.5–1.3)
EGFR: 119 ML/MIN/1.73M2 — SIGNIFICANT CHANGE UP
GLUCOSE SERPL-MCNC: 92 MG/DL — SIGNIFICANT CHANGE UP (ref 70–99)
HCT VFR BLD CALC: 36.8 % — SIGNIFICANT CHANGE UP (ref 34.5–45)
HGB BLD-MCNC: 12.1 G/DL — SIGNIFICANT CHANGE UP (ref 11.5–15.5)
MAGNESIUM SERPL-MCNC: 2.1 MG/DL — SIGNIFICANT CHANGE UP (ref 1.6–2.6)
MCHC RBC-ENTMCNC: 30.8 PG — SIGNIFICANT CHANGE UP (ref 27–34)
MCHC RBC-ENTMCNC: 32.9 GM/DL — SIGNIFICANT CHANGE UP (ref 32–36)
MCV RBC AUTO: 93.6 FL — SIGNIFICANT CHANGE UP (ref 80–100)
NRBC # BLD: 0 /100 WBCS — SIGNIFICANT CHANGE UP (ref 0–0)
PHOSPHATE SERPL-MCNC: 3.5 MG/DL — SIGNIFICANT CHANGE UP (ref 2.5–4.5)
PLATELET # BLD AUTO: 266 K/UL — SIGNIFICANT CHANGE UP (ref 150–400)
POTASSIUM SERPL-MCNC: 4.2 MMOL/L — SIGNIFICANT CHANGE UP (ref 3.5–5.3)
POTASSIUM SERPL-SCNC: 4.2 MMOL/L — SIGNIFICANT CHANGE UP (ref 3.5–5.3)
RBC # BLD: 3.93 M/UL — SIGNIFICANT CHANGE UP (ref 3.8–5.2)
RBC # FLD: 11.9 % — SIGNIFICANT CHANGE UP (ref 10.3–14.5)
SODIUM SERPL-SCNC: 139 MMOL/L — SIGNIFICANT CHANGE UP (ref 135–145)
WBC # BLD: 7.37 K/UL — SIGNIFICANT CHANGE UP (ref 3.8–10.5)
WBC # FLD AUTO: 7.37 K/UL — SIGNIFICANT CHANGE UP (ref 3.8–10.5)

## 2024-09-09 PROCEDURE — 73720 MRI LWR EXTREMITY W/O&W/DYE: CPT | Mod: MC

## 2024-09-09 PROCEDURE — 84702 CHORIONIC GONADOTROPIN TEST: CPT

## 2024-09-09 PROCEDURE — 83735 ASSAY OF MAGNESIUM: CPT

## 2024-09-09 PROCEDURE — 85652 RBC SED RATE AUTOMATED: CPT

## 2024-09-09 PROCEDURE — 86140 C-REACTIVE PROTEIN: CPT

## 2024-09-09 PROCEDURE — 99239 HOSP IP/OBS DSCHRG MGMT >30: CPT | Mod: GC

## 2024-09-09 PROCEDURE — 87040 BLOOD CULTURE FOR BACTERIA: CPT

## 2024-09-09 PROCEDURE — 85027 COMPLETE CBC AUTOMATED: CPT

## 2024-09-09 PROCEDURE — 86900 BLOOD TYPING SEROLOGIC ABO: CPT

## 2024-09-09 PROCEDURE — 96374 THER/PROPH/DIAG INJ IV PUSH: CPT

## 2024-09-09 PROCEDURE — 80053 COMPREHEN METABOLIC PANEL: CPT

## 2024-09-09 PROCEDURE — 36415 COLL VENOUS BLD VENIPUNCTURE: CPT

## 2024-09-09 PROCEDURE — 96375 TX/PRO/DX INJ NEW DRUG ADDON: CPT

## 2024-09-09 PROCEDURE — 80048 BASIC METABOLIC PNL TOTAL CA: CPT

## 2024-09-09 PROCEDURE — 73630 X-RAY EXAM OF FOOT: CPT

## 2024-09-09 PROCEDURE — 73701 CT LOWER EXTREMITY W/DYE: CPT | Mod: MC

## 2024-09-09 PROCEDURE — 99285 EMERGENCY DEPT VISIT HI MDM: CPT | Mod: 25

## 2024-09-09 PROCEDURE — 84100 ASSAY OF PHOSPHORUS: CPT

## 2024-09-09 PROCEDURE — 86850 RBC ANTIBODY SCREEN: CPT

## 2024-09-09 PROCEDURE — 93005 ELECTROCARDIOGRAM TRACING: CPT

## 2024-09-09 PROCEDURE — 86901 BLOOD TYPING SEROLOGIC RH(D): CPT

## 2024-09-09 PROCEDURE — A9585: CPT

## 2024-09-09 PROCEDURE — 99232 SBSQ HOSP IP/OBS MODERATE 35: CPT

## 2024-09-09 PROCEDURE — 85025 COMPLETE CBC W/AUTO DIFF WBC: CPT

## 2024-09-09 RX ORDER — CEFPODOXIME PROXETIL 100 MG/5ML
2 GRANULE, FOR SUSPENSION ORAL
Qty: 48 | Refills: 0
Start: 2024-09-09 | End: 2024-09-20

## 2024-09-09 RX ORDER — DOXYCYCLINE MONOHYDRATE 100 MG
1 TABLET ORAL
Qty: 24 | Refills: 0
Start: 2024-09-09 | End: 2024-09-20

## 2024-09-09 RX ADMIN — Medication 100 MILLIGRAM(S): at 05:41

## 2024-09-09 NOTE — PROGRESS NOTE ADULT - PROBLEM SELECTOR PLAN 2
N: regular diet  E: replete as necessary  DVT: Lovenox 40mg q24h  PPI: none  Code: full code  Dispo: 7U

## 2024-09-09 NOTE — PROGRESS NOTE ADULT - SUBJECTIVE AND OBJECTIVE BOX
Patient is a 29y old  Female who presents with a chief complaint of R 2nd toe cellulitis (09 Sep 2024 08:00)      INTERVAL HPI/ OVERNIGHT EVENTS. Dressing C/D/I. No acute events.       LABS                        12.1   7.37  )-----------( 266      ( 09 Sep 2024 09:12 )             36.8     09-09    139  |  105  |  11  ----------------------------<  92  4.2   |  27  |  0.71    Ca    9.5      09 Sep 2024 09:12  Phos  3.5     09-09  Mg     2.1     09-09          ICU Vital Signs Last 24 Hrs  T(C): 37.1 (09 Sep 2024 05:17), Max: 37.1 (09 Sep 2024 05:17)  T(F): 98.8 (09 Sep 2024 05:17), Max: 98.8 (09 Sep 2024 05:17)  HR: 76 (09 Sep 2024 05:17) (71 - 76)  BP: 101/68 (09 Sep 2024 05:17) (98/52 - 101/68)  BP(mean): --  ABP: --  ABP(mean): --  RR: 17 (09 Sep 2024 05:17) (17 - 18)  SpO2: 99% (09 Sep 2024 05:17) (99% - 100%)    O2 Parameters below as of 09 Sep 2024 05:17  Patient On (Oxygen Delivery Method): room air            RADIOLOGY  < from: CT Foot w/ IV Cont, Right (09.05.24 @ 22:22) >  IMPRESSION:  Dorsal foot subcutaneous edema, which may be seen in cellulitis. No   evidence of soft tissue abscess, osteomyelitis, or soft tissue gas.    --- End of Report ---    < end of copied text >    < from: Xray Foot AP + Lateral + Oblique, Right (09.05.24 @ 19:47) >  IMPRESSION: Negative for an acute displaced fracture or dislocation. The   joint spaces are preserved. There is no evidence of a radiopaque foreign   body.    --- End of Report ---    < end of copied text >      < from: MR Foot w/wo IV Cont, Right (09.07.24 @ 16:39) >  IMPRESSION:  Prominent localized soft tissue edema and enhancement within the   interosseous muscles surrounding the second metatarsal head/neck,   findings which may be posttraumatic or infectious. Small second MTP joint   effusion is also present which may be reactive or infectious. No MRI   evidence for osteomyelitis or abscess. No MRI evidence for foreign body.    --- End of Report ---    < end of copied text >        MICROBIOLOGY    PHYSICAL EXAM  Lower Extremity Focused  Vasc: DP, PT 2/4. TG warm to cool. Improvement of edema to dorsal foot. medial foot erythema  Derm: Dorsal puncture wounds to Right 2nd toe. Superficial wounds. No fluctuance. No drainage. No malodor. No PTB. Purplish discoloration to dorsal forefoot. Blanches on palpation. Interspaces are intact.   Neuro: Protective sensation intact.   MSK: -TTP to dorsal foot. No pain on ROM of 2nd toe.

## 2024-09-09 NOTE — PROGRESS NOTE ADULT - ATTENDING COMMENTS
Cellulitis - continued improvement.  Suspect residual purplish changes at base of 2nd toe may be envenomation rather than ongoing cellulitis.  Would continue ceftriaxone and doxy for today, likely able to change to po tomorrow.  I will cover through 9/8, Dr Cramer will resume care 9/9 - team 1.
R 2nd toe injury/cellulitis with salt water exposure. Erythema regressed significantly based upon pictures.  She is s/p MRI, would f/u read.  Change doxy to 100 mg po q12h, continue ceftriaxone.  I will cover through 9/8, Dr Cramer will resume care 9/9 - team 1.
29F, previously healthy, who ~1 week ago [place] [facility] ([place] when she was stung ?by a stingray on dorsum of R 2nd toe. She went to urgent care and had wound irrigated and got a tetanus shot. She was then soaking her foot with espom salt soaks, and treating with topical neosporin. Swelling of R foot initially improved, but then 9/5 swelling worsened for which she presented to ED. CT R foot w/ contrast with dorsal foot subQ edema without abscess/gas or signs of OM. Patient was given cefazolin/doxy/levaquin in ED, and then switched to cefazolin by medicine team. No cultures obtained from foot or blood. Patient was switched to cefazolin but R foot swelling/erythema did not improve and so ID consulted. Switched to doxy/ceftriaxone with rapid improvement in R foot swelling/erythema. Requested MRI which showed small R 2nd MTP joint effusion - this is directly underlying the trauma from injury, and no impressive pain with active/passive ROM or axial loading of this joint. Low clinical suspicion for septic joint. Will switch to doxy/cefpodoxime to complete two week course (EOT 9/20). Gave sun sensitivity and esophagitis precautions. I will follow up with patient in the ID office in 2 weeks to ensure all symptoms have resolved.
Patient was seen and examined at bedside on 9/8/2024 at 11 am. Patient reports that the distal part of her foot is minimally changed. Denies abdominal pain, SOB, N/V. ROS is otherwise negative. Vitals, labwork and pertinent imaging reviewed. Exam - NAD, AAO x 4, PERRLA, EOMI, MMM, supple neck, chest - CTA b/l, CV - rrr, s1s2, no m/r/g, abd - soft, NTND, + BS, ext - wwp, RLE - distal foot w/ erythema, punctures at digits 2 - 4, psych - normal affect, skin - no rash    Plan:  -ID consulted - query if need for broadening of coverage  -C/w CTX and Doxycycline  -MRI w/o evidence of OM

## 2024-09-09 NOTE — PROGRESS NOTE ADULT - REASON FOR ADMISSION
R 2nd toe cellulitis

## 2024-09-09 NOTE — PROGRESS NOTE ADULT - PROBLEM SELECTOR PLAN 1
Patient presenting w/ swollen R 2nd toe w/ a non purulent open wound after a laceration 7 days ago while swimming. CT and Xray of R foot showing swelling but no presence of abscess or gas accumulation. Podiatry evaluated patient and administered local care with plans for further evaluation. Likely non-purulent cellulitis. S/p cefazolin 1g x1, doxycycline 100mg x1, levofloxacin 750mg PO x1    Plan:   - C/w CTX 2g q12  - c/w doxy 100mg q12 PO- advised pt to sit up for at least 30 minutes with taking medication  - ID following, appreciate recs  - F/u podiatry recs

## 2024-09-09 NOTE — DISCHARGE NOTE NURSING/CASE MANAGEMENT/SOCIAL WORK - NSDCPEFALRISK_GEN_ALL_CORE
For information on Fall & Injury Prevention, visit: [email or web address] OR  [email or web address] OR  https://www.cdc.gov/steadi/patient.html

## 2024-09-09 NOTE — DISCHARGE NOTE NURSING/CASE MANAGEMENT/SOCIAL WORK - PATIENT PORTAL LINK FT
You can access the FollowMyHealth Patient Portal offered by [facility] by registering at the following website: [email or web address]. By joining [facility]’s FollowMyHealth portal, you will also be able to view your health information using other applications (apps) compatible with our system.

## 2024-09-09 NOTE — PROGRESS NOTE ADULT - SUBJECTIVE AND OBJECTIVE BOX
INTERVAL HPI/OVERNIGHT EVENTS: RILEY    Pt resting in bed, pt states feeling well today, with no acute complaints at this time. Pt denies cp, sob, n/v/d.     CONSTITUTIONAL:  Negative fever or chills, feels well, good appetite  CARDIOVASCULAR:  Negative for chest pain   RESPIRATORY:  Negative for cough  GASTROINTESTINAL:  Negative for nausea, vomiting, diarrhea, constipation, or abdominal pain  GENITOURINARY:  Negative frequency, urgency or dysuria  NEUROLOGIC:  No headache, confusion, dizziness, lightheadedness      ANTIBIOTICS/RELEVANT:    MEDICATIONS  (STANDING):  cefTRIAXone   IVPB 2000 milliGRAM(s) IV Intermittent every 24 hours  doxycycline monohydrate Capsule 100 milliGRAM(s) Oral every 12 hours  enoxaparin Injectable 40 milliGRAM(s) SubCutaneous every 24 hours    MEDICATIONS  (PRN):        Vital Signs Last 24 Hrs  T(C): 37.1 (09 Sep 2024 05:17), Max: 37.1 (09 Sep 2024 05:17)  T(F): 98.8 (09 Sep 2024 05:17), Max: 98.8 (09 Sep 2024 05:17)  HR: 76 (09 Sep 2024 05:17) (71 - 76)  BP: 101/68 (09 Sep 2024 05:17) (98/52 - 101/68)  BP(mean): --  RR: 17 (09 Sep 2024 05:17) (17 - 18)  SpO2: 99% (09 Sep 2024 05:17) (99% - 100%)    Parameters below as of 09 Sep 2024 05:17  Patient On (Oxygen Delivery Method): room air        PHYSICAL EXAM:  Constitutional: NAD  Eyes: Sclerae anicteric, conjunctivae clear, EOMI  Ear/Nose/Throat: MMM.  Pulm: No respiratory distress, nonlabored breathing, on room air  Cardiovascular: HDS  Gastrointestinal: soft, NT/ND  Extremities: R foot with superficial puncture wound at dorsum of forefoot and R 2nd toe. brown discoloration at forefoot, however no purulence/drainage/foul-smell or TTP. Erythema/edema decreased within demarcated borders. Slight discomfort with axial loading of R second toe.       LABS:                        12.1   7.37  )-----------( 266      ( 09 Sep 2024 09:12 )             36.8     09-09    139  |  105  |  11  ----------------------------<  92  4.2   |  27  |  0.71    Ca    9.5      09 Sep 2024 09:12  Phos  3.5     09-09  Mg     2.1     09-09        Urinalysis Basic - ( 09 Sep 2024 09:12 )    Color: x / Appearance: x / SG: x / pH: x  Gluc: 92 mg/dL / Ketone: x  / Bili: x / Urobili: x   Blood: x / Protein: x / Nitrite: x   Leuk Esterase: x / RBC: x / WBC x   Sq Epi: x / Non Sq Epi: x / Bacteria: x        MICROBIOLOGY:    RADIOLOGY & ADDITIONAL STUDIES: INTERVAL HPI/OVERNIGHT EVENTS: RILEY    Pt resting in bed, pt states feeling well today, with no acute complaints at this time. Pt denies cp, sob, n/v/d.     CONSTITUTIONAL:  Negative fever or chills, feels well, good appetite  CARDIOVASCULAR:  Negative for chest pain   RESPIRATORY:  Negative for cough  GASTROINTESTINAL:  Negative for nausea, vomiting, diarrhea, constipation, or abdominal pain  GENITOURINARY:  Negative frequency, urgency or dysuria  NEUROLOGIC:  No headache, confusion, dizziness, lightheadedness      ANTIBIOTICS/RELEVANT:    MEDICATIONS  (STANDING):  cefTRIAXone   IVPB 2000 milliGRAM(s) IV Intermittent every 24 hours  doxycycline monohydrate Capsule 100 milliGRAM(s) Oral every 12 hours  enoxaparin Injectable 40 milliGRAM(s) SubCutaneous every 24 hours    MEDICATIONS  (PRN):        Vital Signs Last 24 Hrs  T(C): 37.1 (09 Sep 2024 05:17), Max: 37.1 (09 Sep 2024 05:17)  T(F): 98.8 (09 Sep 2024 05:17), Max: 98.8 (09 Sep 2024 05:17)  HR: 76 (09 Sep 2024 05:17) (71 - 76)  BP: 101/68 (09 Sep 2024 05:17) (98/52 - 101/68)  BP(mean): --  RR: 17 (09 Sep 2024 05:17) (17 - 18)  SpO2: 99% (09 Sep 2024 05:17) (99% - 100%)    Parameters below as of 09 Sep 2024 05:17  Patient On (Oxygen Delivery Method): room air        PHYSICAL EXAM:  Constitutional: NAD  Eyes: Sclerae anicteric, conjunctivae clear, EOMI  Ear/Nose/Throat: MMM.  Pulm: No respiratory distress, nonlabored breathing, on room air  Cardiovascular: HDS  Gastrointestinal: soft, NT/ND  Extremities: R foot with superficial puncture wound at dorsum of forefoot and R 2nd toe. brown discoloration at forefoot, however no purulence/drainage/foul-smell or TTP. Erythema/edema decreased within demarcated borders. Minimal discomfort with axial loading of R second toe.       LABS:                        12.1   7.37  )-----------( 266      ( 09 Sep 2024 09:12 )             36.8     09-09    139  |  105  |  11  ----------------------------<  92  4.2   |  27  |  0.71    Ca    9.5      09 Sep 2024 09:12  Phos  3.5     09-09  Mg     2.1     09-09        Urinalysis Basic - ( 09 Sep 2024 09:12 )    Color: x / Appearance: x / SG: x / pH: x  Gluc: 92 mg/dL / Ketone: x  / Bili: x / Urobili: x   Blood: x / Protein: x / Nitrite: x   Leuk Esterase: x / RBC: x / WBC x   Sq Epi: x / Non Sq Epi: x / Bacteria: x        MICROBIOLOGY:    RADIOLOGY & ADDITIONAL STUDIES:

## 2024-09-09 NOTE — PROGRESS NOTE ADULT - SUBJECTIVE AND OBJECTIVE BOX
INTERVAL/OVERNIGHT EVENTS: None    SUBJECTIVE:  Patient seen and examined at bedside, comfortable, NAD. She noticed some drainage from her R foot wound, clear in color. This morning, no drainage noted. Denied fever, chest pain, dyspnea, abdominal pain.     Vital Signs Last 12 Hrs  T(F): 98.8 (09-09-24 @ 05:17), Max: 98.8 (09-09-24 @ 05:17)  HR: 76 (09-09-24 @ 05:17) (76 - 76)  BP: 101/68 (09-09-24 @ 05:17) (101/68 - 101/68)  BP(mean): --  RR: 17 (09-09-24 @ 05:17) (17 - 17)  SpO2: 99% (09-09-24 @ 05:17) (99% - 99%)  I&O's Summary      PHYSICAL EXAM:  General: NAD  HEENT: PERRL, EOM intact, sclera anicteric, MMM  Cardiovascular: RRR; no MRG;   Respiratory: CTAB; no WRR  GI/: soft; NTND; BS x4  Extremities: WWP; 2+ peripheral pulses bilaterally; no LE edema  R foot: RLE w/ erythema at digits 2 - 4, no swelling, drainage or induration  Skin: normal color & turgor; no rash  Neurologic: aox3; no focal deficits    LABS:                        12.7   7.49  )-----------( 252      ( 08 Sep 2024 05:30 )             39.2     09-08    141  |  105  |  15  ----------------------------<  92  3.6   |  26  |  0.74    Ca    9.8      08 Sep 2024 05:30  Phos  3.8     09-08  Mg     2.1     09-08        Urinalysis Basic - ( 08 Sep 2024 05:30 )    Color: x / Appearance: x / SG: x / pH: x  Gluc: 92 mg/dL / Ketone: x  / Bili: x / Urobili: x   Blood: x / Protein: x / Nitrite: x   Leuk Esterase: x / RBC: x / WBC x   Sq Epi: x / Non Sq Epi: x / Bacteria: x          RADIOLOGY & ADDITIONAL TESTS:    MEDICATIONS  (STANDING):  cefTRIAXone   IVPB 2000 milliGRAM(s) IV Intermittent every 24 hours  doxycycline monohydrate Capsule 100 milliGRAM(s) Oral every 12 hours  enoxaparin Injectable 40 milliGRAM(s) SubCutaneous every 24 hours    MEDICATIONS  (PRN):

## 2024-09-09 NOTE — PROGRESS NOTE ADULT - ASSESSMENT
Pt is a 29 year old female with no PMH presents for discoloration and swelling to dorsal Right foot due to an injury in salt water after swimming [place] the [employer] last week when her foot was cut/bit/stung in the water. Pt remains afebrile, VSS WBC 6.55, ESR 15, CRP 6.3. Given salt water exposure recommended covering for vibrio species as well, currently on Doxycycline and CTX (escalated from Ancef). Discoloration/numbness can also be due to toxins from bite/sting. MRI foot without underlying bone involvement or abscess. Pt with decreased toe swelling and clinically improving. Can transition to PO Abx today for a total of 2 weeks (9/6-9/20) and follow up as outpatient.     Recommendations   - Stop  CTX 2g IV q24  - Start Cefpodoxime 400mg PO BID  (2 weeks, until 9/20)  - c/w Doxycyline 100mg PO BID  (2 weeks, until 9/20)  - Discharge patient with Cefpodoxime 400mg PO BID and Doxycyline 100mg PO BID (2 weeks, until 9/20)  - Patient to follow up with Dr. Cramer after completion of Abx course ([address] [place] [address], [phone])      ID Team 1 will sign off, reach out if any further questions.

## 2024-09-09 NOTE — PROGRESS NOTE ADULT - PROVIDER SPECIALTY LIST ADULT
Infectious Disease
Infectious Disease
Podiatry
Podiatry
Infectious Disease
Internal Medicine
Podiatry
Hospitalist
Podiatry
Internal Medicine

## 2024-09-09 NOTE — PROGRESS NOTE ADULT - ASSESSMENT
29 year old female with no significant PMHx who presents for pain, discoloration and swelling to dorsal R 2nd toe likely 2/2 non-purulent cellulitis. Will admit to [facility] for IV antibiotics and further management by podiatry.

## 2024-09-09 NOTE — PROGRESS NOTE ADULT - TIME BILLING
Evaluation of patient, review of labs, d/w MRI
Managing SSTI
Evaluation of patient, review of charts, d/w ID

## 2024-09-09 NOTE — PROGRESS NOTE ADULT - ASSESSMENT
29 year old female with no PMH presents for discoloration and swelling to dorsal Right foot due to an injury in salt water. Says she was swimming in the [employer] last week when her foot was cut in the water. She isn't sure what it was, but thinks it was a sting ray. Pt has dorsal puncture wounds to Right 2nd toe. Superficial wounds. No fluctuance. No drainage. No malodor. No PTB. Purplish discoloration to dorsal forefoot. Blanches on palpation. Interspaces are intact. At time of consult, WBC 10.3, ESR 15, CRP 6.3. VSS. There is no evidence of foreign body, no gas, no fracture as seen on Xray. Concern for abnormally high swelling of Right foot. Improving of edema to dorsal foot.     9/9: Major improvement in edema and erythema since admission    Plan:    -Abx per ID  -Dressing changes, local wound care, monitoring edema, and monitoring of cardinal signs of infection.  -Offloading/WB status: WBAT to Right heel in surgical shoe   -Right lower extremity should be placed in a elevated position.  -Dressed with betadine paint, DSD, ACE.   -Rest of care up to primary team      Podiatry following, Plan discussed with attending.    Discharge dressing instructions: Cleanse wound with normal sailine daily, pat dry with sterile guaze, apply  betadine soaked gauze to wound base, cover with dry sterile gauze, ABD pad, wrap with Kerlix and light ACE bandage.     Patient should follow up with Dr. Kasandra Paulson 9/13 upon discharge.    Office information:          [place] Address- [address] [address] Phone: [phone]         [place] Address- [address], [place] [address] Phone: [phone] 29 year old female with no PMH presents for discoloration and swelling to dorsal Right foot due to an injury in salt water. Says she was swimming in the [place] last week when her foot was cut in the water. She isn't sure what it was, but thinks it was a sting ray. Pt has dorsal puncture wounds to Right 2nd toe. Superficial wounds. No fluctuance. No drainage. No malodor. No PTB. Purplish discoloration to dorsal forefoot. Blanches on palpation. Interspaces are intact. At time of consult, WBC 10.3, ESR 15, CRP 6.3. VSS. There is no evidence of foreign body, no gas, no fracture as seen on Xray. Concern for abnormally high swelling of Right foot. Improving of edema to dorsal foot.     9/9: Major improvement in edema and erythema since admission    Plan:    -Abx per ID  -Dressing changes, local wound care, monitoring edema, and monitoring of cardinal signs of infection.  -Offloading/WB status: WBAT to Right heel in surgical shoe   -Right lower extremity should be placed in a elevated position.  -Dressed with betadine paint, DSD, ACE.   -Rest of care up to primary team      Podiatry following, Plan discussed with attending.    Discharge dressing instructions: Cleanse wound with betadine flush daily, pat dry with sterile guaze, apply betadine paint to wound base, cover with dry sterile gauze, wrap with Kerlix and light ACE bandage.     Patient should follow up with Dr. Kasandra Paulson 9/13 upon discharge.    Office information:          [place] Address- [address] [address] Phone: [phone]         [place] Address- [address], [place] [address] Phone: [phone]

## 2024-09-11 ENCOUNTER — TRANSCRIPTION ENCOUNTER (OUTPATIENT)
Age: 29
End: 2024-09-11

## 2024-09-12 LAB
CULTURE RESULTS: SIGNIFICANT CHANGE UP
SPECIMEN SOURCE: SIGNIFICANT CHANGE UP
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
## 2024-09-13 DIAGNOSIS — T63.511A: ICD-10-CM

## 2024-09-13 DIAGNOSIS — L03.031 CELLULITIS OF RIGHT TOE: ICD-10-CM

## 2024-09-13 DIAGNOSIS — S91.139A: ICD-10-CM
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
## 2024-09-13 DIAGNOSIS — M25.474 EFFUSION, RIGHT FOOT: ICD-10-CM
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
## 2024-09-13 PROBLEM — Z00.00 ENCOUNTER FOR PREVENTIVE HEALTH EXAMINATION: Status: ACTIVE | Noted: 2024-09-13

## 2024-09-13 NOTE — CHART NOTE - NSCHARTNOTEFT_GEN_A_CORE
Spoke to patient - reports foot continues to improve, is compliant with medications. Has had no side effects from antibiotics. No questions or concerns elicited. Importance of follow up discussed

## 2024-10-04 ENCOUNTER — APPOINTMENT (OUTPATIENT)
Dept: INFECTIOUS DISEASE | Facility: CLINIC | Age: 29
End: 2024-10-04
Payer: COMMERCIAL

## 2024-10-04 VITALS
OXYGEN SATURATION: 98 % | SYSTOLIC BLOOD PRESSURE: 120 MMHG | HEART RATE: 102 BPM | HEIGHT: 68 IN | TEMPERATURE: 97.3 F | DIASTOLIC BLOOD PRESSURE: 76 MMHG | BODY MASS INDEX: 25.76 KG/M2 | WEIGHT: 170 LBS

## 2024-10-04 PROCEDURE — 99214 OFFICE O/P EST MOD 30 MIN: CPT

## 2024-10-04 NOTE — PHYSICAL EXAM
[General Appearance - Alert] : alert [General Appearance - Well-Appearing] : healthy appearing [] : no respiratory distress [Abdomen Soft] : soft [FreeTextEntry1] : R foot with resolved erythema, but persistent purple discoloration affecting skin overlying 2nd-3rd MTP joints. No pain with ROM or axial loading of toes. No significant tenderness at area of discoloration.

## 2024-10-04 NOTE — HISTORY OF PRESENT ILLNESS
[FreeTextEntry1] : 29F, previously healthy, who in late 8/2024 was [place] the [facility] when she was stung ?by a stingray on dorsum of R 2nd toe. She went to urgent care and had wound irrigated and got a tetanus shot. She was then soaking her foot with espom salt soaks, and treating with topical neosporin. Swelling of R foot initially improved, but then 9/5 swelling worsened for which she presented to ED. CT R foot w/ contrast with dorsal foot subQ edema without abscess/gas or signs of OM. Patient was given cefazolin/doxy/levaquin in ED, and then switched to cefazolin by medicine team. No cultures obtained from foot or blood. Patient was switched to cefazolin but R foot swelling/erythema did not improve and so ID consulted. We switched to doxy/ceftriaxone with rapid improvement in R foot swelling/erythema. Requested MRI which showed small R 2nd MTP joint effusion - this is directly underlying the trauma from injury, and no impressive pain with active/passive ROM or axial loading of this joint. Low clinical suspicion for septic joint. Discharged on 9/9 with doxy/cefpodoxime to complete two week course (EOT 9/20).  Patient tolerated PO abx well. She had complete resolution of erythema/findings of SSTI affecting her R foot. Still has bruising/discoloration at the area of the sting, but she is now able to walk with normal shoes (previously was wearing a boot after discharge).

## 2024-10-04 NOTE — ASSESSMENT
[FreeTextEntry1] : # R foot SSTI after sting injury in [place] - SSTI resolved with doxy + ceftriaxone -> doxy + cefpodoxime x 2 week course (EOT 9/20) - Still has residual discoloration at site of sting injury as described above. This area is not concerning for residual infection, but rather it represents direct tissue injury from trauma/envenomation. It is overall slowly improving. I expect it will continue to improve slowly. Patient plans to make an appointment to follow up with Podiatry as previously recommended. - Gave return precautions for recurrent redness/pain affecting foot, or any systemic sx of infection (we reviewed these)  RTC PRN

## 2025-06-05 NOTE — PATIENT PROFILE ADULT - TRANSPORTATION
Last colonoscopy was with [facility]. Will send new referral to discuss diarrhea and colon cancer screening.   
no